# Patient Record
Sex: FEMALE | Race: WHITE | Employment: FULL TIME | URBAN - METROPOLITAN AREA
[De-identification: names, ages, dates, MRNs, and addresses within clinical notes are randomized per-mention and may not be internally consistent; named-entity substitution may affect disease eponyms.]

---

## 2017-06-01 ENCOUNTER — OFFICE VISIT - HEALTHEAST (OUTPATIENT)
Dept: INTERNAL MEDICINE | Facility: CLINIC | Age: 52
End: 2017-06-01

## 2017-06-01 DIAGNOSIS — M54.30 SCIATICA: ICD-10-CM

## 2017-08-16 ENCOUNTER — COMMUNICATION - HEALTHEAST (OUTPATIENT)
Dept: INTERNAL MEDICINE | Facility: CLINIC | Age: 52
End: 2017-08-16

## 2017-08-28 ENCOUNTER — COMMUNICATION - HEALTHEAST (OUTPATIENT)
Dept: ADMINISTRATIVE | Facility: CLINIC | Age: 52
End: 2017-08-28

## 2017-08-28 ENCOUNTER — OFFICE VISIT - HEALTHEAST (OUTPATIENT)
Dept: PODIATRY | Facility: CLINIC | Age: 52
End: 2017-08-28

## 2017-08-28 DIAGNOSIS — L97.509 ULCER OF FOOT, UNSPECIFIED LATERALITY, UNSPECIFIED ULCER STAGE (H): ICD-10-CM

## 2017-08-28 DIAGNOSIS — S92.302A: ICD-10-CM

## 2017-08-28 DIAGNOSIS — S99.922S FOOT INJURY, LEFT, SEQUELA: ICD-10-CM

## 2017-08-28 DIAGNOSIS — L74.512 HYPERHIDROSIS OF PALMS AND SOLES: ICD-10-CM

## 2017-08-28 DIAGNOSIS — L74.513 HYPERHIDROSIS OF PALMS AND SOLES: ICD-10-CM

## 2017-08-28 DIAGNOSIS — S91.312A LACERATION OF LEFT FOOT: ICD-10-CM

## 2017-08-28 DIAGNOSIS — L97.502: ICD-10-CM

## 2017-08-30 ENCOUNTER — RECORDS - HEALTHEAST (OUTPATIENT)
Dept: ADMINISTRATIVE | Facility: OTHER | Age: 52
End: 2017-08-30

## 2017-08-30 ENCOUNTER — AMBULATORY - HEALTHEAST (OUTPATIENT)
Dept: PODIATRY | Facility: CLINIC | Age: 52
End: 2017-08-30

## 2017-08-30 DIAGNOSIS — G89.18 POSTOPERATIVE PAIN: ICD-10-CM

## 2017-09-07 ENCOUNTER — OFFICE VISIT - HEALTHEAST (OUTPATIENT)
Dept: PODIATRY | Facility: CLINIC | Age: 52
End: 2017-09-07

## 2017-09-07 DIAGNOSIS — S92.301D: ICD-10-CM

## 2017-09-07 DIAGNOSIS — S91.312D: ICD-10-CM

## 2017-10-09 ENCOUNTER — RECORDS - HEALTHEAST (OUTPATIENT)
Dept: GENERAL RADIOLOGY | Facility: CLINIC | Age: 52
End: 2017-10-09

## 2017-10-09 ENCOUNTER — OFFICE VISIT - HEALTHEAST (OUTPATIENT)
Dept: PODIATRY | Facility: CLINIC | Age: 52
End: 2017-10-09

## 2017-10-09 ENCOUNTER — AMBULATORY - HEALTHEAST (OUTPATIENT)
Dept: PODIATRY | Facility: CLINIC | Age: 52
End: 2017-10-09

## 2017-10-09 DIAGNOSIS — M72.2 PLANTAR FASCIITIS: ICD-10-CM

## 2017-10-09 DIAGNOSIS — S92.302D FRACTURE OF UNSPECIFIED METATARSAL BONE(S), LEFT FOOT, SUBSEQUENT ENCOUNTER FOR FRACTURE WITH ROUTINE HEALING: ICD-10-CM

## 2017-10-09 DIAGNOSIS — S92.302D: ICD-10-CM

## 2017-10-10 ENCOUNTER — OFFICE VISIT - HEALTHEAST (OUTPATIENT)
Dept: VASCULAR SURGERY | Facility: CLINIC | Age: 52
End: 2017-10-10

## 2017-10-10 DIAGNOSIS — L74.512 HYPERHIDROSIS OF PALMS AND SOLES: ICD-10-CM

## 2017-10-10 DIAGNOSIS — L74.513 HYPERHIDROSIS OF PALMS AND SOLES: ICD-10-CM

## 2017-10-10 DIAGNOSIS — L97.509 ULCER OF FOOT, UNSPECIFIED LATERALITY, UNSPECIFIED ULCER STAGE (H): ICD-10-CM

## 2017-10-10 DIAGNOSIS — S99.922S FOOT INJURY, LEFT, SEQUELA: ICD-10-CM

## 2017-10-31 ENCOUNTER — RECORDS - HEALTHEAST (OUTPATIENT)
Dept: ADMINISTRATIVE | Facility: OTHER | Age: 52
End: 2017-10-31

## 2017-10-31 ENCOUNTER — OFFICE VISIT - HEALTHEAST (OUTPATIENT)
Dept: VASCULAR SURGERY | Facility: CLINIC | Age: 52
End: 2017-10-31

## 2017-10-31 DIAGNOSIS — L74.512 HYPERHIDROSIS OF PALMS AND SOLES: ICD-10-CM

## 2017-10-31 DIAGNOSIS — L74.513 HYPERHIDROSIS OF PALMS AND SOLES: ICD-10-CM

## 2017-10-31 DIAGNOSIS — L97.509 ULCER OF FOOT, UNSPECIFIED LATERALITY, UNSPECIFIED ULCER STAGE (H): ICD-10-CM

## 2017-10-31 DIAGNOSIS — S99.922S FOOT INJURY, LEFT, SEQUELA: ICD-10-CM

## 2017-11-21 ENCOUNTER — OFFICE VISIT - HEALTHEAST (OUTPATIENT)
Dept: VASCULAR SURGERY | Facility: CLINIC | Age: 52
End: 2017-11-21

## 2017-11-21 DIAGNOSIS — L74.512 HYPERHIDROSIS OF PALMS AND SOLES: ICD-10-CM

## 2017-11-21 DIAGNOSIS — S99.922S FOOT INJURY, LEFT, SEQUELA: ICD-10-CM

## 2017-11-21 DIAGNOSIS — L97.509 ULCER OF FOOT, UNSPECIFIED LATERALITY, UNSPECIFIED ULCER STAGE (H): ICD-10-CM

## 2017-11-21 DIAGNOSIS — L74.513 HYPERHIDROSIS OF PALMS AND SOLES: ICD-10-CM

## 2017-11-29 ENCOUNTER — RECORDS - HEALTHEAST (OUTPATIENT)
Dept: ADMINISTRATIVE | Facility: OTHER | Age: 52
End: 2017-11-29

## 2017-12-04 ENCOUNTER — COMMUNICATION - HEALTHEAST (OUTPATIENT)
Dept: VASCULAR SURGERY | Facility: CLINIC | Age: 52
End: 2017-12-04

## 2017-12-04 DIAGNOSIS — L97.502: ICD-10-CM

## 2017-12-05 ENCOUNTER — AMBULATORY - HEALTHEAST (OUTPATIENT)
Dept: VASCULAR SURGERY | Facility: CLINIC | Age: 52
End: 2017-12-05

## 2017-12-07 ENCOUNTER — COMMUNICATION - HEALTHEAST (OUTPATIENT)
Dept: ADMINISTRATIVE | Facility: CLINIC | Age: 52
End: 2017-12-07

## 2017-12-08 ENCOUNTER — COMMUNICATION - HEALTHEAST (OUTPATIENT)
Dept: VASCULAR SURGERY | Facility: CLINIC | Age: 52
End: 2017-12-08

## 2017-12-11 ENCOUNTER — AMBULATORY - HEALTHEAST (OUTPATIENT)
Dept: VASCULAR SURGERY | Facility: CLINIC | Age: 52
End: 2017-12-11

## 2017-12-11 DIAGNOSIS — S92.902G CLOSED FRACTURE OF LEFT FOOT WITH DELAYED HEALING, SUBSEQUENT ENCOUNTER: ICD-10-CM

## 2017-12-19 ENCOUNTER — OFFICE VISIT - HEALTHEAST (OUTPATIENT)
Dept: VASCULAR SURGERY | Facility: CLINIC | Age: 52
End: 2017-12-19

## 2017-12-19 DIAGNOSIS — S92.309A METATARSAL FRACTURE: ICD-10-CM

## 2018-01-02 ENCOUNTER — OFFICE VISIT - HEALTHEAST (OUTPATIENT)
Dept: VASCULAR SURGERY | Facility: CLINIC | Age: 53
End: 2018-01-02

## 2018-01-02 DIAGNOSIS — S92.309A METATARSAL FRACTURE: ICD-10-CM

## 2018-02-27 ENCOUNTER — AMBULATORY - HEALTHEAST (OUTPATIENT)
Dept: VASCULAR SURGERY | Facility: CLINIC | Age: 53
End: 2018-02-27

## 2018-02-27 ENCOUNTER — OFFICE VISIT - HEALTHEAST (OUTPATIENT)
Dept: VASCULAR SURGERY | Facility: CLINIC | Age: 53
End: 2018-02-27

## 2018-02-27 DIAGNOSIS — S92.309A METATARSAL FRACTURE: ICD-10-CM

## 2018-03-20 ENCOUNTER — OFFICE VISIT - HEALTHEAST (OUTPATIENT)
Dept: VASCULAR SURGERY | Facility: CLINIC | Age: 53
End: 2018-03-20

## 2018-03-20 DIAGNOSIS — S92.902D CLOSED FRACTURE OF LEFT FOOT WITH ROUTINE HEALING, SUBSEQUENT ENCOUNTER: ICD-10-CM

## 2018-03-29 ENCOUNTER — OFFICE VISIT - HEALTHEAST (OUTPATIENT)
Dept: VASCULAR SURGERY | Facility: CLINIC | Age: 53
End: 2018-03-29

## 2018-03-29 DIAGNOSIS — B35.1 ONYCHOMYCOSIS: ICD-10-CM

## 2018-03-29 LAB
ALBUMIN SERPL-MCNC: 3.7 G/DL (ref 3.5–5)
ALP SERPL-CCNC: 123 U/L (ref 45–120)
ALT SERPL W P-5'-P-CCNC: 30 U/L (ref 0–45)
AST SERPL W P-5'-P-CCNC: 20 U/L (ref 0–40)
BILIRUB DIRECT SERPL-MCNC: 0.1 MG/DL
BILIRUB SERPL-MCNC: 0.3 MG/DL (ref 0–1)
PROT SERPL-MCNC: 7.2 G/DL (ref 6–8)

## 2018-03-30 ENCOUNTER — AMBULATORY - HEALTHEAST (OUTPATIENT)
Dept: VASCULAR SURGERY | Facility: CLINIC | Age: 53
End: 2018-03-30

## 2018-03-30 DIAGNOSIS — B35.1 ONYCHOMYCOSIS: ICD-10-CM

## 2018-04-12 ENCOUNTER — OFFICE VISIT - HEALTHEAST (OUTPATIENT)
Dept: VASCULAR SURGERY | Facility: CLINIC | Age: 53
End: 2018-04-12

## 2018-04-12 DIAGNOSIS — M84.375D STRESS FRACTURE OF LEFT FOOT WITH ROUTINE HEALING, SUBSEQUENT ENCOUNTER: ICD-10-CM

## 2018-04-26 ENCOUNTER — OFFICE VISIT - HEALTHEAST (OUTPATIENT)
Dept: VASCULAR SURGERY | Facility: CLINIC | Age: 53
End: 2018-04-26

## 2018-04-26 DIAGNOSIS — M84.375D STRESS FRACTURE OF LEFT FOOT WITH ROUTINE HEALING, SUBSEQUENT ENCOUNTER: ICD-10-CM

## 2018-05-10 ENCOUNTER — OFFICE VISIT - HEALTHEAST (OUTPATIENT)
Dept: VASCULAR SURGERY | Facility: CLINIC | Age: 53
End: 2018-05-10

## 2018-05-10 DIAGNOSIS — B35.1 ONYCHOMYCOSIS: ICD-10-CM

## 2018-05-10 DIAGNOSIS — M84.375D STRESS FRACTURE OF LEFT FOOT WITH ROUTINE HEALING, SUBSEQUENT ENCOUNTER: ICD-10-CM

## 2018-05-22 ENCOUNTER — RECORDS - HEALTHEAST (OUTPATIENT)
Dept: ADMINISTRATIVE | Facility: OTHER | Age: 53
End: 2018-05-22

## 2018-07-31 ENCOUNTER — AMBULATORY - HEALTHEAST (OUTPATIENT)
Dept: VASCULAR SURGERY | Facility: CLINIC | Age: 53
End: 2018-07-31

## 2018-07-31 DIAGNOSIS — T14.90XA TRAUMA: ICD-10-CM

## 2018-10-27 ENCOUNTER — HOSPITAL ENCOUNTER (EMERGENCY)
Facility: CLINIC | Age: 53
Discharge: HOME | End: 2018-10-27
Attending: EMERGENCY MEDICINE | Admitting: EMERGENCY MEDICINE
Payer: OTHER MISCELLANEOUS

## 2018-10-27 VITALS
RESPIRATION RATE: 16 BRPM | OXYGEN SATURATION: 96 % | TEMPERATURE: 98.2 F | DIASTOLIC BLOOD PRESSURE: 86 MMHG | SYSTOLIC BLOOD PRESSURE: 151 MMHG | WEIGHT: 214.2 LBS

## 2018-10-27 DIAGNOSIS — L03.113 CELLULITIS OF RIGHT UPPER EXTREMITY: ICD-10-CM

## 2018-10-27 PROCEDURE — 99283 EMERGENCY DEPT VISIT LOW MDM: CPT | Performed by: EMERGENCY MEDICINE

## 2018-10-27 PROCEDURE — 99283 EMERGENCY DEPT VISIT LOW MDM: CPT | Mod: Z6 | Performed by: EMERGENCY MEDICINE

## 2018-10-27 RX ORDER — CEPHALEXIN 500 MG/1
500 CAPSULE ORAL 4 TIMES DAILY
Qty: 40 CAPSULE | Refills: 0 | Status: SHIPPED | OUTPATIENT
Start: 2018-10-27 | End: 2018-11-06

## 2018-10-27 NOTE — DISCHARGE INSTRUCTIONS
Discharge Instructions for Cellulitis  You have been diagnosed with cellulitis. This is an infection in the deepest layer of the skin. In some cases, the infection also affects the muscle. Cellulitis is caused by bacteria. The bacteria can enter the body through broken skin. This can happen with a cut, scratch, animal bite, or an insect bite that has been scratched. You may have been treated in the hospital with antibiotics and fluids. You will likely be given a prescription for antibiotics to take at home. This sheet will help you take care of yourself at home.  Home care  When you are home:    Take the prescribed antibiotic medicine you are given as directed until it is gone. Take it even if you feel better. It treats the infection and stops it from returning. Not taking all the medicine can make future infections hard to treat.    Keep the infected area clean.    When possible, raise the infected area above the level of your heart. This helps keep swelling down.    Talk with your healthcare provider if you are in pain. Ask what kind of over-the-counter medicine you can take for pain.    Apply clean bandages as advised.    Take your temperature once a day for a week.    Wash your hands often to prevent spreading the infection.  In the future, wash your hands before and after you touch cuts, scratches, or bandages. This will help prevent infection.   When to call your healthcare provider  Call your healthcare provider immediately if you have any of the following:    Difficulty or pain when moving the joints above or below the infected area    Discharge or pus draining from the area    Fever of 100.4 F (38 C) or higher, or as directed by your healthcare provider    Pain that gets worse in or around the infected     Redness that gets worse in or around the infected area, particularly if the area of redness expands to a wider area    Shaking chills    Swelling of the infected area    Vomiting   Date Last Reviewed:  8/1/2016 2000-2017 [employer]. [address] [place] [address], [place] [address]. All rights reserved. This information is not intended as a substitute for professional medical care. Always follow your healthcare professional's instructions.

## 2018-10-27 NOTE — ED AVS SNAPSHOT
[facility] Emergency Department    [address] [place] [address]    Phone:  [phone]    Fax:  [phone]                                       Padmini Carpenter   MRN: [id number]    Department:  [facility] Emergency Department   Date of Visit:  10/27/2018           Patient Information     Date Of Birth          1965        Your diagnoses for this visit were:     Cellulitis of right upper extremity        You were seen by Leif Rodrigues MD.      Follow-up Information     Follow up with primary care.    Why:  ER follow up, If symptoms worsen        Discharge Instructions         Discharge Instructions for Cellulitis  You have been diagnosed with cellulitis. This is an infection in the deepest layer of the skin. In some cases, the infection also affects the muscle. Cellulitis is caused by bacteria. The bacteria can enter the body through broken skin. This can happen with a cut, scratch, animal bite, or an insect bite that has been scratched. You may have been treated in the hospital with antibiotics and fluids. You will likely be given a prescription for antibiotics to take at home. This sheet will help you take care of yourself at home.  Home care  When you are home:    Take the prescribed antibiotic medicine you are given as directed until it is gone. Take it even if you feel better. It treats the infection and stops it from returning. Not taking all the medicine can make future infections hard to treat.    Keep the infected area clean.    When possible, raise the infected area above the level of your heart. This helps keep swelling down.    Talk with your healthcare provider if you are in pain. Ask what kind of over-the-counter medicine you can take for pain.    Apply clean bandages as advised.    Take your temperature once a day for a week.    Wash your hands often to prevent spreading the infection.  In the future, wash your hands before and after you touch cuts,  scratches, or bandages. This will help prevent infection.   When to call your healthcare provider  Call your healthcare provider immediately if you have any of the following:    Difficulty or pain when moving the joints above or below the infected area    Discharge or pus draining from the area    Fever of 100.4 F (38 C) or higher, or as directed by your healthcare provider    Pain that gets worse in or around the infected     Redness that gets worse in or around the infected area, particularly if the area of redness expands to a wider area    Shaking chills    Swelling of the infected area    Vomiting   Date Last Reviewed: 8/1/2016 2000-2017 [employer]. [address] [place] [address]. All rights reserved. This information is not intended as a substitute for professional medical care. Always follow your healthcare professional's instructions.          24 Hour Appointment Hotline       To make an appointment at any [facility], call [phone] ([phone]). If you don't have a family doctor or clinic, we will help you find one. [facility] clinics are conveniently located to serve the needs of you and your family.             Review of your medicines      START taking        Dose / Directions Last dose taken    cephALEXin 500 MG capsule   Commonly known as:  KEFLEX   Dose:  500 mg   Quantity:  40 capsule        Take 1 capsule (500 mg) by mouth 4 times daily for 10 days   Refills:  0          Our records show that you are taking the medicines listed below. If these are incorrect, please call your family doctor or clinic.        Dose / Directions Last dose taken    acetaminophen-caffeine 500-65 MG Tabs   Commonly known as:  EXCEDRIN TENSION HEADACHE   Dose:  2 tablet        Take 2 tablets by mouth every 6 hours as needed for mild pain   Refills:  0                Prescriptions were sent or printed at these locations (1 Prescription)                   [facility]  "- [place] - [address]   [address] [place] [address] [facility] [address]    Telephone:  [phone]   Fax:  [phone]   Hours:                  E-Prescribed (1 of 1)         cephALEXin (KEFLEX) 500 MG capsule                Orders Needing Specimen Collection     None      Pending Results     No orders found from 10/25/2018 to 10/28/2018.            Pending Culture Results     No orders found from 10/25/2018 to 10/28/2018.            Pending Results Instructions     If you had any lab results that were not finalized at the time of your Discharge, you can call the ED Lab Result RN at [phone]. You will be contacted by this team for any positive Lab results or changes in treatment. The nurses are available 7 days a week from 10A to 6:30P.  You can leave a message 24 hours per day and they will return your call.        Thank you for choosing [facility]       Thank you for choosing [facility] for your care. Our goal is always to provide you with excellent care. Hearing back from our patients is one way we can continue to improve our services. Please take a few minutes to complete the written survey that you may receive in the mail after you visit with us. Thank you!        [employer] Information     [facility] lets you send messages to your doctor, view your test results, renew your prescriptions, schedule appointments and more. To sign up, go to [email or web address] . Click on \"Log in\" on the left side of the screen, which will take you to the Welcome page. Then click on \"Sign up Now\" on the right side of the page.     You will be asked to enter the access code listed below, as well as some personal information. Please follow the directions to create your username and password.     Your access code is: [email or web address]  Expires: 2019  6:05 PM     Your access code will  in 90 days. If you need help or a new code, please call your [facility] clinic or [phone].        Care EveryWhere ID     This is your " Care EveryWhere ID. This could be used by other organizations to access your [facility] medical records  [id number]        Equal Access to Services     SAJAN GUY : Verona Charlton, uegene roberts, montez vieyra, bret galaviz. So [facility] [phone].    ATENCIÓN: Si habla español, tiene a roman disposición servicios gratuitos de asistencia lingüística. Llame al [phone].    We comply with applicable federal civil rights laws and [place] laws. We do not discriminate on the basis of race, color, national origin, age, disability, sex, sexual orientation, or gender identity.            After Visit Summary       This is your record. Keep this with you and show to your community pharmacist(s) and doctor(s) at your next visit.

## 2018-10-27 NOTE — ED PROVIDER NOTES
History     Chief Complaint   Patient presents with     Medication Reaction     HPI  Padmini Carpenter is a 53 year old female who since the emergency department complaining of a rash to her right upper extremity.  She received the herpes zoster vaccine on Wednesday, 3 days ago.  She noticed redness and swelling and firmness to her right upper extremity near where the injection site was.  She has not had fever, chills, nausea, vomiting, abdominal pain, dysuria, hematuria or other new symptoms,    Problem List:    There are no active problems to display for this patient.       Past Medical History:    History reviewed. No pertinent past medical history.    Past Surgical History:    History reviewed. No pertinent surgical history.    Family History:    No family history on file.    Social History:  Marital Status:   [2]  Social History   Substance Use Topics     Smoking status: Current Every Day Smoker     Packs/day: 1.25     Smokeless tobacco: Never Used     Alcohol use Not on file        Medications:      acetaminophen-caffeine (EXCEDRIN TENSION HEADACHE) 500-65 MG TABS   cephALEXin (KEFLEX) 500 MG capsule         Review of Systems   All other systems reviewed and are negative.      Physical Exam   BP: 151/86  Heart Rate: 82  Temp: 98.2  F (36.8  C)  Resp: 16  Weight: 97.2 kg (214 lb 3.2 oz)  SpO2: 96 %      Physical Exam   Constitutional: She is oriented to person, place, and time. She appears well-developed and well-nourished. No distress.   HENT:   Head: Normocephalic and atraumatic.   Eyes: No scleral icterus.   Neck: Normal range of motion. Neck supple.   Cardiovascular: Normal rate.    Pulmonary/Chest: Effort normal. No respiratory distress.   Abdominal: Soft.   Neurological: She is alert and oriented to person, place, and time.   Skin: Skin is warm and dry. Rash noted. She is not diaphoretic. There is erythema. No pallor.   Area of erythema as shown in the picture below.  There is a firm area where  likely the injection was that is not fluctuant.   Psychiatric: She has a normal mood and affect.           ED Course     ED Course     Procedures            No results found for this or any previous visit (from the past 24 hour(s)).    Medications - No data to display    Assessments & Plan (with Medical Decision Making)  Cellulitis, secondary to recent vaccine.  I think this is less likely to be allergic reaction but more likely to be a complication of infection related to the injection.  I do not think there is any abscess there at this point.  There is no fluctuance.  No systemic symptoms.  She is appropriate for outpatient treatment.  In regards to her follow-up zoster vaccination he may consider doing titers first to see if she needs a second vaccination but I do not think the injection would be contraindicated I would just recommend cleaning the area vigorously prior to giving her follow-up vaccination.  Will treat with Keflex.     I have reviewed the nursing notes.    I have reviewed the findings, diagnosis, plan and need for follow up with the patient.      New Prescriptions    CEPHALEXIN (KEFLEX) 500 MG CAPSULE    Take 1 capsule (500 mg) by mouth 4 times daily for 10 days       Final diagnoses:   Cellulitis of right upper extremity       10/27/2018   [facility] EMERGENCY DEPARTMENT     Leif Rodrigues MD  10/27/18 5419

## 2018-10-27 NOTE — ED AVS SNAPSHOT
[facility] Emergency Department    [address] [place] [address]    Phone:  [phone]    Fax:  [phone]                                       Padmini Carpenter   MRN: [id number]    Department:  [facility] Emergency Department   Date of Visit:  10/27/2018           After Visit Summary Signature Page     I have received my discharge instructions, and my questions have been answered. I have discussed any challenges I see with this plan with the nurse or doctor.    ..........................................................................................................................................  Patient/Patient Representative Signature      ..........................................................................................................................................  Patient Representative Print Name and Relationship to Patient    ..................................................               ................................................  Date                                   Time    ..........................................................................................................................................  Reviewed by Signature/Title    ...................................................              ..............................................  Date                                               Time          22EPIC Rev 08/18

## 2020-06-08 ENCOUNTER — MEDICAL CORRESPONDENCE (OUTPATIENT)
Dept: HEALTH INFORMATION MANAGEMENT | Facility: CLINIC | Age: 55
End: 2020-06-08

## 2020-06-09 ENCOUNTER — HOSPITAL ENCOUNTER (EMERGENCY)
Facility: CLINIC | Age: 55
Discharge: HOME | End: 2020-06-09
Attending: FAMILY MEDICINE | Admitting: FAMILY MEDICINE
Payer: COMMERCIAL

## 2020-06-09 ENCOUNTER — APPOINTMENT (OUTPATIENT)
Dept: GENERAL RADIOLOGY | Facility: CLINIC | Age: 55
End: 2020-06-09
Attending: FAMILY MEDICINE
Payer: COMMERCIAL

## 2020-06-09 ENCOUNTER — APPOINTMENT (OUTPATIENT)
Dept: CT IMAGING | Facility: CLINIC | Age: 55
End: 2020-06-09
Attending: FAMILY MEDICINE
Payer: COMMERCIAL

## 2020-06-09 VITALS
HEART RATE: 75 BPM | TEMPERATURE: 98.2 F | RESPIRATION RATE: 20 BRPM | DIASTOLIC BLOOD PRESSURE: 62 MMHG | SYSTOLIC BLOOD PRESSURE: 107 MMHG | OXYGEN SATURATION: 93 %

## 2020-06-09 DIAGNOSIS — R51.9 ACUTE NONINTRACTABLE HEADACHE, UNSPECIFIED HEADACHE TYPE: ICD-10-CM

## 2020-06-09 DIAGNOSIS — D72.819 LEUKOPENIA, UNSPECIFIED TYPE: ICD-10-CM

## 2020-06-09 DIAGNOSIS — R11.10 VOMITING AND DIARRHEA: ICD-10-CM

## 2020-06-09 DIAGNOSIS — R06.00 DYSPNEA, UNSPECIFIED TYPE: ICD-10-CM

## 2020-06-09 DIAGNOSIS — R19.7 VOMITING AND DIARRHEA: ICD-10-CM

## 2020-06-09 DIAGNOSIS — R91.8 PULMONARY NODULES: ICD-10-CM

## 2020-06-09 DIAGNOSIS — E87.6 HYPOKALEMIA: ICD-10-CM

## 2020-06-09 LAB
ANION GAP SERPL CALCULATED.3IONS-SCNC: 10 MMOL/L (ref 3–14)
BASOPHILS # BLD AUTO: 0 10E9/L (ref 0–0.2)
BASOPHILS NFR BLD AUTO: 0.8 %
BUN SERPL-MCNC: 23 MG/DL (ref 7–30)
CALCIUM SERPL-MCNC: 8.7 MG/DL (ref 8.5–10.1)
CHLORIDE SERPL-SCNC: 112 MMOL/L (ref 94–109)
CO2 SERPL-SCNC: 21 MMOL/L (ref 20–32)
CREAT SERPL-MCNC: 1.24 MG/DL (ref 0.52–1.04)
D DIMER PPP FEU-MCNC: >20 UG/ML FEU (ref 0–0.5)
DIFFERENTIAL METHOD BLD: ABNORMAL
EOSINOPHIL NFR BLD AUTO: 0.8 %
ERYTHROCYTE [DISTWIDTH] IN BLOOD BY AUTOMATED COUNT: 14.5 % (ref 10–15)
GFR SERPL CREATININE-BSD FRML MDRD: 49 ML/MIN/{1.73_M2}
GLUCOSE SERPL-MCNC: 106 MG/DL (ref 70–99)
HCT VFR BLD AUTO: 43.2 % (ref 35–47)
HGB BLD-MCNC: 14.1 G/DL (ref 11.7–15.7)
IMM GRANULOCYTES # BLD: 0.1 10E9/L (ref 0–0.4)
IMM GRANULOCYTES NFR BLD: 4.1 %
LYMPHOCYTES # BLD AUTO: 0.2 10E9/L (ref 0.8–5.3)
LYMPHOCYTES NFR BLD AUTO: 13 %
MCH RBC QN AUTO: 28 PG (ref 26.5–33)
MCHC RBC AUTO-ENTMCNC: 32.6 G/DL (ref 31.5–36.5)
MCV RBC AUTO: 86 FL (ref 78–100)
MONOCYTES # BLD AUTO: 0 10E9/L (ref 0–1.3)
MONOCYTES NFR BLD AUTO: 0.8 %
NEUTROPHILS # BLD AUTO: 1 10E9/L (ref 1.6–8.3)
NEUTROPHILS NFR BLD AUTO: 80.5 %
NRBC # BLD AUTO: 0 10*3/UL
NRBC BLD AUTO-RTO: 2 /100
PLATELET # BLD AUTO: 142 10E9/L (ref 150–450)
POTASSIUM SERPL-SCNC: 3.1 MMOL/L (ref 3.4–5.3)
RBC # BLD AUTO: 5.04 10E12/L (ref 3.8–5.2)
SODIUM SERPL-SCNC: 143 MMOL/L (ref 133–144)
TROPONIN I SERPL-MCNC: <0.015 UG/L (ref 0–0.04)
TROPONIN I SERPL-MCNC: <0.015 UG/L (ref 0–0.04)
WBC # BLD AUTO: 1.2 10E9/L (ref 4–11)

## 2020-06-09 PROCEDURE — 85025 COMPLETE CBC W/AUTO DIFF WBC: CPT | Performed by: FAMILY MEDICINE

## 2020-06-09 PROCEDURE — 96361 HYDRATE IV INFUSION ADD-ON: CPT | Performed by: FAMILY MEDICINE

## 2020-06-09 PROCEDURE — 25000128 H RX IP 250 OP 636: Performed by: FAMILY MEDICINE

## 2020-06-09 PROCEDURE — 71045 X-RAY EXAM CHEST 1 VIEW: CPT | Mod: TC

## 2020-06-09 PROCEDURE — 25800030 ZZH RX IP 258 OP 636: Performed by: EMERGENCY MEDICINE

## 2020-06-09 PROCEDURE — 99285 EMERGENCY DEPT VISIT HI MDM: CPT | Mod: 25 | Performed by: FAMILY MEDICINE

## 2020-06-09 PROCEDURE — 84484 ASSAY OF TROPONIN QUANT: CPT | Mod: 91 | Performed by: FAMILY MEDICINE

## 2020-06-09 PROCEDURE — 93010 ELECTROCARDIOGRAM REPORT: CPT | Mod: Z6 | Performed by: FAMILY MEDICINE

## 2020-06-09 PROCEDURE — 25000125 ZZHC RX 250: Performed by: FAMILY MEDICINE

## 2020-06-09 PROCEDURE — 93005 ELECTROCARDIOGRAM TRACING: CPT | Mod: 76 | Performed by: FAMILY MEDICINE

## 2020-06-09 PROCEDURE — 96374 THER/PROPH/DIAG INJ IV PUSH: CPT | Mod: 59 | Performed by: FAMILY MEDICINE

## 2020-06-09 PROCEDURE — 85379 FIBRIN DEGRADATION QUANT: CPT | Performed by: FAMILY MEDICINE

## 2020-06-09 PROCEDURE — 93005 ELECTROCARDIOGRAM TRACING: CPT | Performed by: FAMILY MEDICINE

## 2020-06-09 PROCEDURE — 25800030 ZZH RX IP 258 OP 636: Performed by: FAMILY MEDICINE

## 2020-06-09 PROCEDURE — 80048 BASIC METABOLIC PNL TOTAL CA: CPT | Performed by: FAMILY MEDICINE

## 2020-06-09 PROCEDURE — 84484 ASSAY OF TROPONIN QUANT: CPT | Performed by: FAMILY MEDICINE

## 2020-06-09 PROCEDURE — 94640 AIRWAY INHALATION TREATMENT: CPT | Performed by: FAMILY MEDICINE

## 2020-06-09 PROCEDURE — 96375 TX/PRO/DX INJ NEW DRUG ADDON: CPT | Performed by: FAMILY MEDICINE

## 2020-06-09 PROCEDURE — 36415 COLL VENOUS BLD VENIPUNCTURE: CPT | Performed by: FAMILY MEDICINE

## 2020-06-09 PROCEDURE — 93010 ELECTROCARDIOGRAM REPORT: CPT | Mod: 76 | Performed by: FAMILY MEDICINE

## 2020-06-09 PROCEDURE — 25000132 ZZH RX MED GY IP 250 OP 250 PS 637: Performed by: FAMILY MEDICINE

## 2020-06-09 PROCEDURE — 71275 CT ANGIOGRAPHY CHEST: CPT

## 2020-06-09 RX ORDER — MORPHINE SULFATE 4 MG/ML
4 INJECTION, SOLUTION INTRAMUSCULAR; INTRAVENOUS ONCE
Status: COMPLETED | OUTPATIENT
Start: 2020-06-09 | End: 2020-06-09

## 2020-06-09 RX ORDER — ONDANSETRON 2 MG/ML
4 INJECTION INTRAMUSCULAR; INTRAVENOUS EVERY 30 MIN PRN
Status: DISCONTINUED | OUTPATIENT
Start: 2020-06-09 | End: 2020-06-09 | Stop reason: HOSPADM

## 2020-06-09 RX ORDER — IPRATROPIUM BROMIDE AND ALBUTEROL SULFATE 2.5; .5 MG/3ML; MG/3ML
3 SOLUTION RESPIRATORY (INHALATION) ONCE
Status: COMPLETED | OUTPATIENT
Start: 2020-06-09 | End: 2020-06-09

## 2020-06-09 RX ORDER — IOPAMIDOL 755 MG/ML
100 INJECTION, SOLUTION INTRAVASCULAR ONCE
Status: COMPLETED | OUTPATIENT
Start: 2020-06-09 | End: 2020-06-09

## 2020-06-09 RX ORDER — POTASSIUM CHLORIDE 1.5 G/1.58G
40 POWDER, FOR SOLUTION ORAL ONCE
Status: COMPLETED | OUTPATIENT
Start: 2020-06-09 | End: 2020-06-09

## 2020-06-09 RX ORDER — IBUPROFEN 800 MG/1
800 TABLET, FILM COATED ORAL ONCE
Status: COMPLETED | OUTPATIENT
Start: 2020-06-09 | End: 2020-06-09

## 2020-06-09 RX ORDER — ONDANSETRON 4 MG/1
4 TABLET, ORALLY DISINTEGRATING ORAL EVERY 6 HOURS PRN
Qty: 12 TABLET | Refills: 0 | Status: SHIPPED | OUTPATIENT
Start: 2020-06-09 | End: 2020-06-14

## 2020-06-09 RX ADMIN — IPRATROPIUM BROMIDE AND ALBUTEROL SULFATE 3 ML: .5; 3 SOLUTION RESPIRATORY (INHALATION) at 02:37

## 2020-06-09 RX ADMIN — MORPHINE SULFATE 4 MG: 4 INJECTION, SOLUTION INTRAMUSCULAR; INTRAVENOUS at 04:40

## 2020-06-09 RX ADMIN — IBUPROFEN 800 MG: 800 TABLET, FILM COATED ORAL at 06:14

## 2020-06-09 RX ADMIN — SODIUM CHLORIDE 1000 ML: 9 INJECTION, SOLUTION INTRAVENOUS at 08:43

## 2020-06-09 RX ADMIN — IOPAMIDOL 70 ML: 755 INJECTION, SOLUTION INTRAVENOUS at 05:11

## 2020-06-09 RX ADMIN — SODIUM CHLORIDE 70 ML: 9 INJECTION, SOLUTION INTRAVENOUS at 05:11

## 2020-06-09 RX ADMIN — POTASSIUM CHLORIDE 40 MEQ: 1.5 POWDER, FOR SOLUTION ORAL at 08:41

## 2020-06-09 RX ADMIN — SODIUM CHLORIDE 1000 ML: 9 INJECTION, SOLUTION INTRAVENOUS at 07:34

## 2020-06-09 RX ADMIN — ONDANSETRON 4 MG: 2 INJECTION INTRAMUSCULAR; INTRAVENOUS at 04:40

## 2020-06-09 NOTE — ED NOTES
Felt light headed after getting up to void. B/P checked, Dr. Lopez notified and fluids ordered and hung. Starting to feeling better.

## 2020-06-09 NOTE — DISCHARGE INSTRUCTIONS
Your work-up is suggestive of a viral infection, quite possibly COVID-19.  Your oxygen levels were excellent but we do need to keep an eye on that and recheck if you worsen.  Expect a call with your COVID results in the next couple of days.  Tylenol/ibuprofen as needed for headache, body aches or fever.  Encourage fluids.  Zofran ODT as needed for nausea.  Return to the ED if increasing shortness of breath, persistent vomiting, bloody diarrhea or any concerns.  You did have a small nodule on your right lung which will require repeat CT scan in 1 year.  Your primary physician can arrange that for you.  In the meantime, try to cut down and eventually quit smoking.  It was a pleasure visiting with you this morning.  I am glad you are feeling at least a little bit better and hope you continue to improve.    Thank you for choosing [facility]. We appreciate the opportunity to meet your urgent medical needs. Please let us know if we could have done anything to make your stay more satisfying.    After discharge, please closely monitor for any new or worsening symptoms. Return to the Emergency Department if you develop any acute worsening signs or symptoms.    If you had lab work, cultures or imaging studies done during your stay, the final results may still be pending. We will call you if your plan of care needs to change. However, if you are not improving as expected, please follow up with your primary care provider or clinic.     Start any prescription medications that were prescribed to you and take them as directed.     Please see additional handouts that may be pertinent to your condition.

## 2020-06-09 NOTE — ED AVS SNAPSHOT
[facility] Emergency Department  [address] [place] [address]  Phone:  [phone]  Fax:  [phone]                                    Padimni Carpenter   MRN: [id number]    Department:  [facility] Emergency Department   Date of Visit:  6/9/2020           After Visit Summary Signature Page    I have received my discharge instructions, and my questions have been answered. I have discussed any challenges I see with this plan with the nurse or doctor.    ..........................................................................................................................................  Patient/Patient Representative Signature      ..........................................................................................................................................  Patient Representative Print Name and Relationship to Patient    ..................................................               ................................................  Date                                   Time    ..........................................................................................................................................  Reviewed by Signature/Title    ...................................................              ..............................................  Date                                               Time          22EPIC Rev 08/18

## 2020-06-09 NOTE — ED TRIAGE NOTES
Pt had a covid test this AM. Smokes one carton of cigarettes a week. Having temps at home. Doesn't have inhalers or nebs. Has'nt really been out any.

## 2020-06-09 NOTE — ED PROVIDER NOTES
History     Chief Complaint   Patient presents with     Shortness of Breath     HPI  Padmini Carpenter is a 55 year old female who presents to the ED with shortness of breath.  She has had trouble since Saturday, 3 days ago.  Had a fever of over 102 all weekend and has a nonproductive cough.  She smokes a carton of cigarettes a week but has not had any problems and wheezing and does not use nebs or inhalers.  She does feel tight and wheezy now.  Had a COVID test done yesterday at our facility but the results are not back yet.  Has not really been out and about to be exposed.  Also N/V/D.    Mild diffuse myalgias and arthralgias along with diffuse headache.    Allergies:  No Known Allergies    Problem List:    There are no active problems to display for this patient.       Past Medical History:    No past medical history on file.    Past Surgical History:    No past surgical history on file.    Family History:    No family history on file.    Social History:  Marital Status:   [2]  Social History     Tobacco Use     Smoking status: Current Every Day Smoker     Packs/day: 1.25     Smokeless tobacco: Never Used   Substance Use Topics     Alcohol use: Not on file     Drug use: Not on file        Medications:    ondansetron (ZOFRAN ODT) 4 MG ODT tab  acetaminophen-caffeine (EXCEDRIN TENSION HEADACHE) 500-65 MG TABS          Review of Systems   All other systems reviewed and are negative.      Physical Exam   BP: 111/66  Pulse: 80  Temp: 98.2  F (36.8  C)  Resp: 20  SpO2: 99 %      Physical Exam  Constitutional:       General: She is in acute distress (mild).      Appearance: She is well-developed.   Eyes:      Extraocular Movements: Extraocular movements intact.   Cardiovascular:      Rate and Rhythm: Normal rate and regular rhythm.   Pulmonary:      Breath sounds: Rhonchi present. No decreased breath sounds or wheezing (just coarse sounds).   Abdominal:      Palpations: Abdomen is soft.      Tenderness: There is  no abdominal tenderness.   Musculoskeletal: Normal range of motion.         General: No swelling or tenderness.   Skin:     General: Skin is warm and dry.   Neurological:      General: No focal deficit present.      Mental Status: She is alert and oriented to person, place, and time.   Psychiatric:         Mood and Affect: Mood normal.         ED Course  (with Medical Decision Making)    55-year-old heavy smoker with 3-day history of fevers along with a nonproductive cough and increasing shortness of breath.  Does not use inhalers or nebs but feels tight and wheezy.  Had a COVID test yesterday but the results are not back yet.  Has not been exposed that she is aware of and really has not gone anywhere.    She does not have much in the way of wheezing on exam but her breath sounds are just a bit coarse and a few rhonchi.  Will try a DuoNeb while waiting for labs to come back.      DuoNeb gave her really no significant relief.       ED Course as of Jun 09 0759   Tue Jun 09, 2020   0325 Troponin I   0327 Basic metabolic panel(!)   0327 CBC with platelets differential(!)   0405 D-dimer markedly elevated at >20.  Chest CT ordered.      0617 Chest CT was negative for pulmonary embolism or infiltrate.  No pleural effusion or evidence of right heart strain.  She does have a 4 mm right middle lobe nodule.  Radiology recommends optional follow-up at 12 months for high risk patients.  She is a smoker.  Initial troponin was undetectable.  Checking a second troponin which is pending.  She is feeling less short of breath.  Oxygen levels have been excellent.  Nursing staff did put her on O2 by nasal cannula just for symptom relief.  Her nausea has also improved.  She has had some mild diffuse arthralgias along with her headache does suggest viral syndrome.  COVID-19 was sent yesterday.      0722 Second troponin was also undetectable.  She feels better after the Toradol and Zofran.  Suspect viral etiology with her fever, body  aches, headache, elevated D-dimer, low WBC and dry cough.  Quite possibly COVID-19.  She was tested at work yesterday and that is pending.  Works at [employer].  She will be off work until those results are back.  Overall, she is feeling better and can be managed as an outpatient.  If she worsens, she needs to return for reevaluation.  She is comfortable with this plan.      0729 She felt a little lightheaded when she got up.  Has been able to drink fluids here systolic blood pressure of 97.  We will give her a liter of normal saline IV and see if that helps her feel a bit better.  I thought I had ordered it earlier but must have forgotten as we had a flurry of ambulances arrive about that time.      0757 Will replace her K+ with oral K-christophe 40 mg        Procedures               EKG Interpretation:      Interpreted by Odin Lopez MD  Time reviewed: 0305  Symptoms at time of EKG: dyspnea   Rhythm: sinus tachycardia  Rate: 106  Axis: Normal  Ectopy: none  Conduction: normal  ST Segments/ T Waves: Mild ST depression V3-6, I, II, F  Q Waves: none  Comparison to prior: No old EKG available    Clinical Impression: Sinus tachycardia at 106 bpm.  Subtle ST depression V3-6, I, II, F.               EKG Interpretation:      Interpreted by Odin Lopez MD  Time reviewed: 5:19 AM    Symptoms at time of EKG: dyspnea   Rhythm: Normal sinus   Rate: 92  Axis: Normal  Ectopy: None  Conduction: Normal  ST Segments/ T Waves: No ST-T wave changes, No acute ischemic changes and mild ST depression appears less on the printed EKG,but when compared on the computer, there are no changes and they lay perfectly  Q Waves: None  Comparison to prior: Unchanged from earlier tonight    Clinical Impression: Sinus rhythm at 92 bpm.  Specific ST/T wave changes with flattening and some mild ST depression which is unchanged from previous EKG.               Critical Care time:  none               Results for orders  placed or performed during the hospital encounter of 06/09/20 (from the past 24 hour(s))   CBC with platelets differential   Result Value Ref Range    WBC 1.2 (L) 4.0 - 11.0 10e9/L    RBC Count 5.04 3.8 - 5.2 10e12/L    Hemoglobin 14.1 11.7 - 15.7 g/dL    Hematocrit 43.2 35.0 - 47.0 %    MCV 86 78 - 100 fl    MCH 28.0 26.5 - 33.0 pg    MCHC 32.6 31.5 - 36.5 g/dL    RDW 14.5 10.0 - 15.0 %    Platelet Count 142 (L) 150 - 450 10e9/L    Diff Method Automated Method     % Neutrophils 80.5 %    % Lymphocytes 13.0 %    % Monocytes 0.8 %    % Eosinophils 0.8 %    % Basophils 0.8 %    % Immature Granulocytes 4.1 %    Nucleated RBCs 2 (H) 0 /100    Absolute Neutrophil 1.0 (L) 1.6 - 8.3 10e9/L    Absolute Lymphocytes 0.2 (L) 0.8 - 5.3 10e9/L    Absolute Monocytes 0.0 0.0 - 1.3 10e9/L    Absolute Basophils 0.0 0.0 - 0.2 10e9/L    Abs Immature Granulocytes 0.1 0 - 0.4 10e9/L    Absolute Nucleated RBC 0.0    Basic metabolic panel   Result Value Ref Range    Sodium 143 133 - 144 mmol/L    Potassium 3.1 (L) 3.4 - 5.3 mmol/L    Chloride 112 (H) 94 - 109 mmol/L    Carbon Dioxide 21 20 - 32 mmol/L    Anion Gap 10 3 - 14 mmol/L    Glucose 106 (H) 70 - 99 mg/dL    Urea Nitrogen 23 7 - 30 mg/dL    Creatinine 1.24 (H) 0.52 - 1.04 mg/dL    GFR Estimate 49 (L) >60 mL/min/[1.73_m2]    GFR Estimate If Black 57 (L) >60 mL/min/[1.73_m2]    Calcium 8.7 8.5 - 10.1 mg/dL   Troponin I   Result Value Ref Range    Troponin I ES <0.015 0.000 - 0.045 ug/L   D dimer quantitative   Result Value Ref Range    D Dimer >20.0 (H) 0.0 - 0.50 ug/ml FEU   XR Chest Port 1 View    Narrative    EXAM: XR CHEST PORT 1 VW  LOCATION: [facility]  DATE/TIME: 6/9/2020 3:03 AM    INDICATION: Shortness of breath  COMPARISON: None.      Impression    IMPRESSION: Shallow inspiration. Cardiomediastinal silhouette is within normal limits. No focal consolidation or pleural effusion. Atherosclerotic aorta. Degenerative change osseous structures.   CT Chest Pulmonary  Embolism w Contrast    Narrative    EXAM: CT CHEST PULMONARY EMBOLISM W CONTRAST  LOCATION: [facility]  DATE/TIME: 6/9/2020 5:01 AM    INDICATION: Dyspnea and elevated d-dimer.  COMPARISON: None.  TECHNIQUE: CT chest pulmonary angiogram during arterial phase injection of IV contrast. Multiplanar reformats and MIP reconstructions were performed. Dose reduction techniques were used.   CONTRAST: 70mLs Isovue 370    FINDINGS:  ANGIOGRAM CHEST: Pulmonary arteries are normal caliber and negative for pulmonary emboli. Thoracic aorta is not well opacified and is  indeterminate for dissection. No CT evidence of right heart strain.    LUNGS AND PLEURA: Mild basilar atelectasis. 4 mm subpleural nodule right middle lobe series 5 image 77. No pleural effusion.    MEDIASTINUM/AXILLAE: Coronary artery calcification. Atherosclerotic aorta. No significant pericardial effusion.    UPPER ABDOMEN: Normal.    MUSCULOSKELETAL: Degenerative change osseous structures.      Impression    IMPRESSION:  1.  No infiltrate, pleural effusion or pulmonary embolism.  2.  4 mm right middle lobe nodule.    REFERENCE:  Guidelines for Management of Incidental Pulmonary Nodules Detected on CT Images: From the Fleischner Society 2017.   Guidelines apply to incidental nodules in patients who are 35 years or older.  Guidelines do not apply to lung cancer screening, patients with immunosuppression, or patients with known primary cancer.    SINGLE NODULE  Nodule size <6 mm  Low-risk patients: No follow-up needed.  High-risk patients: Optional follow-up at 12 months.    Nodule size 6-8 mm  Low-risk patients: Follow-up CT at 6-12 months, then consider CT at 18-24 months.  High-risk patients: Follow-up CT at 6-12 months, then at 18-24 months if no change.    Nodule size >8 mm  Either low or high-risk patients:  Consider CT, PET/CT, or tissue sampling at 3 months.    Consider referral to lung nodule clinic.     Troponin I   Result Value Ref Range     Troponin I ES <0.015 0.000 - 0.045 ug/L       Medications   ondansetron (ZOFRAN) injection 4 mg (4 mg Intravenous Given 6/9/20 0440)   0.9% sodium chloride BOLUS (1,000 mLs Intravenous New Bag 6/9/20 0734)   potassium chloride (KLOR-CON) Packet 40 mEq (has no administration in time range)   ipratropium - albuterol 0.5 mg/2.5 mg/3 mL (DUONEB) neb solution 3 mL (3 mLs Nebulization Given 6/9/20 0237)   morphine (PF) injection 4 mg (4 mg Intravenous Given 6/9/20 0440)   iopamidol (ISOVUE-370) solution 100 mL (70 mLs Intravenous Given 6/9/20 0511)   Sodium Chloride 0.9 % bag 100mL for CT scan (70 mLs Intravenous Given 6/9/20 0511)   sodium chloride (PF) 0.9% PF flush 3 mL (10 mLs Intracatheter Given 6/9/20 0511)   ibuprofen (ADVIL/MOTRIN) tablet 800 mg (800 mg Oral Given 6/9/20 0614)       Assessments & Plan     I have reviewed the nursing notes.    I have reviewed the findings, diagnosis, plan and need for follow up with the patient.       New Prescriptions    ONDANSETRON (ZOFRAN ODT) 4 MG ODT TAB    Take 1 tablet (4 mg) by mouth every 6 hours as needed for nausea       Final diagnoses:   Dyspnea, unspecified type   Leukopenia, unspecified type - suspect viral etiology - possibly COVID-19   Vomiting and diarrhea   Acute nonintractable headache, unspecified headache type   Pulmonary nodules - solitary 4 mm RML   Hypokalemia - mild 3.1       6/9/2020   [facility] EMERGENCY DEPARTMENT     Odin Lopez MD  06/09/20 0192

## 2020-06-10 ENCOUNTER — VIRTUAL VISIT (OUTPATIENT)
Dept: PEDIATRICS | Facility: CLINIC | Age: 55
End: 2020-06-10
Payer: COMMERCIAL

## 2020-06-10 DIAGNOSIS — R51.9 NONINTRACTABLE HEADACHE, UNSPECIFIED CHRONICITY PATTERN, UNSPECIFIED HEADACHE TYPE: Primary | ICD-10-CM

## 2020-06-10 PROCEDURE — 99203 OFFICE O/P NEW LOW 30 MIN: CPT | Mod: GT | Performed by: FAMILY MEDICINE

## 2020-06-10 RX ORDER — TRAMADOL HYDROCHLORIDE 50 MG/1
50 TABLET ORAL EVERY 6 HOURS PRN
Qty: 8 TABLET | Refills: 0 | Status: SHIPPED | OUTPATIENT
Start: 2020-06-10 | End: 2020-06-12

## 2020-06-10 NOTE — PROGRESS NOTES
"Padmini Carpenter is a 55 year old female who is being evaluated via a billable video visit.      The patient has been notified of following:     \"This video visit will be conducted via a call between you and your physician/provider. We have found that certain health care needs can be provided without the need for an in-person physical exam.  This service lets us provide the care you need with a video conversation.  If a prescription is necessary we can send it directly to your pharmacy.  If lab work is needed we can place an order for that and you can then stop by our lab to have the test done at a later time.    Video visits are billed at different rates depending on your insurance coverage.  Please reach out to your insurance provider with any questions.    If during the course of the call the physician/provider feels a video visit is not appropriate, you will not be charged for this service.\"    Patient has given verbal consent for Video visit? Yes    How would you like to obtain your AVS? Mail a copy    Patient would like the video invitation sent by: Text to cell phone: [phone]    Will anyone else be joining your video visit? No      Subjective     Padmini Carpenter is a 55 year old female who presents today via video visit for the following health issues:    HPI      Video Start Time: 11:11 AM    Headaches      Duration: Monday 2 days ago    Description  Location: bilateral in the frontal area   Character: throbbing pain  Frequency:  constant    Intensity:  moderate    Accompanying signs and symptoms:    Precipitating or Alleviating factors:  Nausea/vomiting: no  Dizziness: no  Weakness or numbness: no  Visual changes: none  Fever: YES  Associated with shortness of breath and cough, COVID screen is pending.    History  Head trauma: YES  Family history of migraines: YES  Previous tests for headaches: no   Neurologist evaluations: no   Able to do daily activities when headache present: no   Wake with " headaches: no   Daily pain medication use: YES  Any changes in: None    Precipitating or Alleviating factors (light/sound/sleep/caffeine): Flu-like symptoms.    Frequent/daily pain medication use: no     Patient states she has a personal history of migraines occurs rarely and improved with Excedrin.    ED records reviewed.    There is no problem list on file for this patient.    History reviewed. No pertinent surgical history.    Social History     Tobacco Use     Smoking status: Current Every Day Smoker     Packs/day: 1.25     Smokeless tobacco: Never Used   Substance Use Topics     Alcohol use: Not on file     History reviewed. No pertinent family history.      Current Outpatient Medications   Medication Sig Dispense Refill     traMADol (ULTRAM) 50 MG tablet Take 1 tablet (50 mg) by mouth every 6 hours as needed for severe pain 8 tablet 0     acetaminophen-caffeine (EXCEDRIN TENSION HEADACHE) 500-65 MG TABS Take 2 tablets by mouth every 6 hours as needed for mild pain       ondansetron (ZOFRAN ODT) 4 MG ODT tab Take 1 tablet (4 mg) by mouth every 6 hours as needed for nausea 12 tablet 0     No Known Allergies    Reviewed and updated as needed this visit by Provider  Tobacco  Allergies  Meds  Problems  Med Hx  Surg Hx  Fam Hx         Review of Systems   Constitutional, HEENT, cardiovascular, pulmonary, gi and gu systems are negative, except as otherwise noted.      Objective    There were no vitals taken for this visit.  There is no height or weight on file to calculate BMI.  Physical Exam     GENERAL: Healthy, alert and no distress  EYES: Eyes grossly normal to inspection.  No discharge or erythema, or obvious scleral/conjunctival abnormalities.  RESP: No audible wheeze, cough, or visible cyanosis.  No visible retractions or increased work of breathing.    SKIN: Visible skin clear. No significant rash, abnormal pigmentation or lesions.  NEURO: Cranial nerves grossly intact.  Mentation and speech appropriate  for age.  PSYCH: Mentation appears normal, affect normal/bright, judgement and insight intact, normal speech and appearance well-groomed.            Assessment & Plan     1. Nonintractable headache, unspecified chronicity pattern, unspecified headache type  Headaches don't seem typical for migraines, Headache not improving with tyelenol, excedrin or ibuprofen. Tramadol for break through pain although advised patient to use with caution. Await COVID result otherwise see at the clinic in a week if COVID screen is negative,  - traMADol (ULTRAM) 50 MG tablet; Take 1 tablet (50 mg) by mouth every 6 hours as needed for severe pain  Dispense: 8 tablet; Refill: 0       Return in about 1 week (around 6/17/2020), or if symptoms worsen or fail to improve, for recheck headache/possible migraine with negative COVID test..    Kadie Sarkar MD  [facility]      Video-Visit Details    Type of service:  Video Visit    Video End Time:11:22 AM    Originating Location (pt. Location): Home    Distant Location (provider location):  [facility]     Platform used for Video Visit: Doximity    Return in about 1 week (around 6/17/2020), or if symptoms worsen or fail to improve, for recheck headache/possible migraine with negative COVID test..       Kadie Sarkar MD

## 2020-06-30 ENCOUNTER — OFFICE VISIT (OUTPATIENT)
Dept: PEDIATRICS | Facility: CLINIC | Age: 55
End: 2020-06-30
Payer: COMMERCIAL

## 2020-06-30 VITALS
WEIGHT: 210.2 LBS | OXYGEN SATURATION: 96 % | SYSTOLIC BLOOD PRESSURE: 131 MMHG | DIASTOLIC BLOOD PRESSURE: 83 MMHG | HEART RATE: 72 BPM | TEMPERATURE: 98.6 F
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
## 2020-06-30 PROCEDURE — 99203 OFFICE O/P NEW LOW 30 MIN: CPT | Performed by: INTERNAL MEDICINE

## 2020-06-30 RX ORDER — CETIRIZINE HYDROCHLORIDE 10 MG/1
TABLET ORAL
Qty: 30 TABLET | Refills: 0 | Status: SHIPPED | OUTPATIENT
Start: 2020-06-30

## 2020-06-30 ASSESSMENT — PAIN SCALES - GENERAL: PAINLEVEL: NO PAIN (0)

## 2020-06-30 NOTE — PROGRESS NOTES
Subjective     Padmini Carpenter is a 55 year old female who presents to clinic today for the following health issues:    HPI   Rash  Onset: last Thursday    Description:   Location: all over, legs, arms, torso and back  Character: round and red with dot in the middle like a bug bite  Itching (Pruritis): YES    Progression of Symptoms:  same    Accompanying Signs & Symptoms:  Fever: no   Body aches or joint pain: no   Sore throat symptoms: no   Recent cold symptoms: no     History:   Previous similar rash: no     Precipitating factors:   Exposure to similar rash: no   New exposures: Patient was stung by a wasp the day before on bottom of her foot  Recent travel: no     Alleviating factors:   None    Therapies Tried and outcome: benadryl    The rash started the day after she came back from visiting her mother. She had helped her mother with yard work.     Pt reported no hotel stay. Her  went on the same trip but no similar rash.     ROS:  Constitutional, HEENT, cardiovascular, pulmonary, gi and gu systems are negative, except as otherwise noted.       acetaminophen-caffeine (EXCEDRIN TENSION HEADACHE) 500-65 MG TABS, Take 2 tablets by mouth every 6 hours as needed for mild pain  [] amoxicillin-clavulanate (AUGMENTIN) 875-125 MG tablet, Take 1 tablet by mouth 2 times daily for 7 days  [] ondansetron (ZOFRAN ODT) 4 MG ODT tab, Take 1 tablet (4 mg) by mouth every 6 hours as needed for nausea  [] traMADol (ULTRAM) 50 MG tablet, Take 1 tablet (50 mg) by mouth every 6 hours as needed for severe pain    No current facility-administered medications on file prior to visit.          There is no problem list on file for this patient.    History reviewed. No pertinent surgical history.    Social History     Tobacco Use     Smoking status: Current Every Day Smoker     Packs/day: 1.25     Smokeless tobacco: Never Used   Substance Use Topics     Alcohol use: Not on file     History reviewed. No pertinent  family history.          Problem list, Medication list, Allergies, and Medical/Social/Surgical histories reviewed in [facility] and updated as appropriate.    OBJECTIVE:                                                    /83 (BP Location: Right arm, Patient Position: Sitting, Cuff Size: Adult Large)   Pulse 72   Temp 98.6  F (37  C) (Temporal)   Wt 95.3 kg (210 lb 3.2 oz)   LMP 03/01/2017 (Approximate)   SpO2 96%     GENERAL: healthy, alert and no distress  Skin: Multiple pink/purplish macules with central punctate on the trunk and lower extremities.      Diagnostic test results:  No results found for any visits on 06/30/20.      ASSESSMENT/PLAN:                                                      55 year old female with the following diagnoses and treatment plan:      ICD-10-CM    1. Bug bite without infection, initial encounter  W57.XXXA cetirizine (ZYRTEC) 10 MG tablet       --Rash consistent with insect/bug bites.  Does not appear to be cellulitic in nature.  I recommended control with high-dose antihistamine as her primary symptoms are itching.  See AVS for details of instruction    Will call or return to clinic if worsening or symptoms not improving as discussed.  See Patient Instructions.      Cyndi Pierce MD-PhD  [facility]    (Note: Chart documentation was done in part with Dragon Voice Recognition software. Although reviewed after completion, some word and grammatical errors may remain.)

## 2020-06-30 NOTE — PATIENT INSTRUCTIONS
Make appointment(s) for:   --     Zyrtec 20 mg in the morning for one week, then 1 tablet daily for 1-2 weeks.    Benadryl at night time.         Medication(s) prescribed today:    Orders Placed This Encounter   Medications     cetirizine (ZYRTEC) 10 MG tablet     Si tablets in the morning daily for one week and 1 tablet daily until gone     Dispense:  30 tablet     Refill:  0

## 2021-01-15 ENCOUNTER — HEALTH MAINTENANCE LETTER (OUTPATIENT)
Age: 56
End: 2021-01-15

## 2021-05-31 VITALS — BODY MASS INDEX: 37.08 KG/M2 | WEIGHT: 216 LBS

## 2021-06-09 ENCOUNTER — TELEPHONE (OUTPATIENT)
Dept: FAMILY MEDICINE | Facility: CLINIC | Age: 56
End: 2021-06-09

## 2021-06-09 NOTE — TELEPHONE ENCOUNTER
Patient Quality Outreach Summary      Summary:    Patient is due/failing the following:   Breast Cancer Screening - Mammogram    Type of outreach:    Sent OneClass message.    Questions for provider review:    None                                                                                                                    Sydney Alexis

## 2021-06-11 NOTE — PROGRESS NOTES
[facility] Follow Up Note  Assessment/Plan  1. Sciatica         Patient Instructions   1. Medications were reviewed and medication refills completed if requested.   A corrected Medication List is listed below on this After Visit    Summary.   New Medications, Refilled medications or Discontinued medications are also listed below.      2. Immunizations were reviewed and updated as necessary.   All up to date  3. If labs were done today, they will either be mailed to you or released to My Chart online if that has been activated.  4. Steroid burst -12 day course of prednisone.  Avoid Advil while on prednisone.  Side effects high-dose prednisone could very likely include insomnia or agitation.  5. Rx pain pills -Rx tramadol to use for breakthrough pain as needed  6.  Because she has no motor weakness and only a two-week history of sciatica obviously we would defer any imaging studies like MRI of the lumbar spine.  7.  We discussed the natural history of sciatica and I showed her a spine model and what would be involved typical sciatica.  We discussed the natural history usually involves spontaneous resolution with time.  The likelihood of getting imaging or ultimate surgery should still be low.     MORE THAN 25 MINUTES WAS SPENT WITH THE PATIENT TODAY OF WHICH GREATER THAN 50% WAS SPENT IN COUNSELING AND COORDINATION OF CARE -  DISCUSSING THE AFOREMENTIONED DIAGNOSES, TREATMENT, AND PLAN.       Body mass index is 37.08 kg/(m^2).    Franky Fine MD  Internal Medicine [facility] [place] [address]  Voice: [phone]  Fax: [phone]    +++++++++++++++++++++++++++++++++++++++    Padmini Carpenter   52 y.o. female    Date of Visit: 6/1/2017    Chief Complaint   Patient presents with     Sciatica     Subjective  1. Health Maintenance  -immunizations are up-to-date.     2.  Acute sciatica -the patient has never had back or sciatica type problems  before.  Over the last 2 weeks she has had pain that emanates from her right buttock and travels down to her knee.  Occasionally the pain travels below her knee to mid calf.  It is located in L5-S1 distribution on the back of her leg.  She has noted no gait abnormalities and no weakness.  Is episodic but when it comes on it is painful.  She has not been taking any over-the-counter medication for this.  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  This weekend they will be traveling about 4 - 5 hours by car to [place] and she will be working over the weekend doing a lot of standing and lifting.  She is worried that her pain will be worse or that she will not have anything to control the pain.        ROS A comprehensive review of systems was performed and was negative other than the problems noted above.    Medications, allergies, and problem list were reviewed and updated    Past Medical History:   Diagnosis Date     Migraine      Ovarian cyst      Past Surgical History:   Procedure Laterality Date     EYE SURGERY      lasik     Social History     Social History     Marital status:      Spouse name: N/A     Number of children: N/A     Years of education: N/A     Occupational History     Not on file.     Social History Main Topics     Smoking status: Current Every Day Smoker     Smokeless tobacco: Not on file     Alcohol use Yes      Comment: rarely     Drug use: No     Sexual activity: Yes     Partners: Male     Birth control/ protection: None     Other Topics Concern     Not on file     Social History Narrative    She is  and has a son and daughter. 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  She does smoke cigarettes.     Family History   Problem Relation Age of Onset      Diabetes Father       after complications from hip surgery at age 80     Cancer Paternal Grandfather      Other Mother      alive       Current Outpatient Prescriptions   Medication Sig Dispense Refill     aspirin-acetaminophen-caffeine (EXCEDRIN MIGRAINE) 250-250-65 mg per tablet Take 1 tablet by mouth every 6 (six) hours as needed for pain.       ibuprofen (ADVIL) 200 MG tablet Take 200 mg by mouth every 6 (six) hours as needed for pain or mild pain (1-3).       predniSONE (DELTASONE) 20 MG tablet Take 3 tabs daily for four days, then take 2 tabs for four days, then 1 tab daily for four days 24 tablet 0     traMADol (ULTRAM) 50 mg tablet Take 1 tablet (50 mg total) by mouth every 8 (eight) hours as needed for pain. 90 tablet 0     No current facility-administered medications for this visit.        No Known Allergies    Exam  Vitals:    17 1424   BP: 122/72   Pulse: 72     The patient is well-groomed and well-dressed alert and oriented ×3.   Head: Negative  HEENT: Normal  Extremities: No peripheral edema.  Pulses are normal and symmetrical.  Neuro: Gait and mentation normal. Cranial Nerves intact.  Right leg raising is negative sitting.  Straight leg raising is positive while supine on the right side.  There is no localizing back or buttock pain or tenderness.  Great toe flexion-extension, foot flexion extension and quadricep strength are all normal and symmetrical.          Franky Fine MD

## 2021-06-12 NOTE — PROGRESS NOTES
Admission History & Physical  Padmini Carpenter, 1965, [id number]    [facility]  April D MD Mirza, [phone]    Extended Emergency Contact Information  Primary Emergency Contact: Tahir Carpenter   [facility]  Home Phone: [phone]  Relation: Spouse     Assessment and Plan:   Assessment: Fractured first metatarsal and laceration left lower extremity  Plan: The patient is to return to the clinic in 10 days for postop visit #2  Active Problems:    * No active hospital problems. *      Chief Complaint:  Laceration left foot     HPI:    Padmini Carpenter is a 52 y.o. old female who presented to the clinic today for postop visit #1, 3 days status post repair of laceration left foot.  The patient stated that approximately 4 days ago she was in [place] and severely injured her foot on a metal pipe.  She was evaluated at the ED at the local hospital.  She had an x-ray taken.  The x-ray showed a spiral oblique nondisplaced fracture of the first metatarsal.  The patient also had severe laceration on the lateral aspect of the left heel.  The patient underwent surgical debridement and primary repair of the laceration by Dr Huseyin Ram at [id number].  She was placed on antibiotics and given a tetanus injection.   History is provided by patient    Medical History  Active Ambulatory (Non-Hospital) Problems    Diagnosis     Migraines     Past Medical History:   Diagnosis Date     Migraine      Ovarian cyst      Patient Active Problem List    Diagnosis Date Noted     Migraines 06/10/2015     Surgical History  She  has a past surgical history that includes Eye surgery.   Past Surgical History:   Procedure Laterality Date     EYE SURGERY      lasik    Social History  Reviewed, and she  reports that she has been smoking.  She has never used smokeless tobacco. She reports that she drinks alcohol. She reports that she does not use illicit drugs.  Social History   Substance Use Topics     Smoking  status: Current Every Day Smoker     Smokeless tobacco: Never Used     Alcohol use Yes      Comment: rarely      Allergies  No Known Allergies Family History  Reviewed, and family history includes Cancer in her paternal grandfather; Diabetes in her father; Other in her mother.   Psychosocial Needs  Social History     Social History Narrative    She is  and has a son and daughter. 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  She does smoke cigarettes.     Additional psychosocial needs reviewed per nursing assessment.       Prior to Admission Medications     (Not in a hospital admission)        Review of Systems - Negative     BP (!) 140/30  Pulse 68  Resp 16  SpO2 97%    Objective findings: General: The patient is alert and in no acute distress    Integument: Skin right foot warm supple and intact.    Vascular: DP PT pulses palpable right foot.  Good capillary refill bilateral feet.    Neurologic: Negative clonus negative Babinski right lower extremity.    Musculoskeletal: Range of motion within normal limits right lower extremity.  Muscle power +5/5 bilaterally in all compartments.  There is a posterior splint on the left lower extremity secondary to surgical repair of laceration of left foot.  No active bleeding noted.  The dressings are clean dry and intact.    Assessment: Fractured first metatarsal and a laceration left foot    Plan: The bandage was left in place.  The patient is to return to the clinic in 10 days at which time the dressings will be removed.  Sutures may also be removed at that time.  The patient is to continue taking antibiotics.

## 2021-06-12 NOTE — PROGRESS NOTES
Subjective findings: The patient return to the clinic today for postop visit #2 status post primary repair of the lacerated left foot.  The patient also has a nondisplaced spiral oblique fracture of the first metatarsal.  The patient stated she has some mild to moderate pain.  She has been nonweightbearing since her last visit.    Objective findings: The wound margins are well coaptated and maintained.  There is moderate edema noted left foot.  Neurovascular status is intact.  Vital signs are stable.  There is no drainage or active bleeding noted.    The dressings are clean dry and intact.     Assessment: Fractured first metatarsal and a laceration left foot     Plan: The sutures were removed today.  A soft dressing was applied to the left foot.  The patient is to remain nonweightbearing for the next 4 weeks.  She is to remove her bandages in 2 weeks.  She is to return to the clinic in 4 weeks for postop visit #3.

## 2021-06-13 NOTE — PROGRESS NOTES
Subjective findings: The patient return to the clinic today for postop visit #3 status post primary repair of the lacerated left foot.  The patient also has a nondisplaced spiral oblique fracture of the first metatarsal.  She has been nonweightbearing since her last visit.  She continues to notice some moderate drainage from the incision.     Objective findings: The wound margins are well healed. There is minimal  edema noted left foot.  Neurovascular status is intact.  Vital signs are stable.  There is no drainage or active bleeding noted.  No sign of infection.       Assessment: Fractured first metatarsal and a laceration left foot      Plan: An x-ray of the left foot was taken today.  I informed the patient that the x-ray shows that the fracture has healed well.  The laceration is continuing to  granulate well.  I recommended that she continue to remain nonweightbearing for the next 6 weeks to allow the incision to heal completely.  I recommended she return to the clinic if she notices a sudden severe increase in pain, edema, erythema, drainage or bleeding.  She is to return to the clinic in 6 weeks for her next scheduled appointment.

## 2021-06-13 NOTE — PROGRESS NOTES
Follow up Vascular Visit       Date of Service:10/10/2017    Date Last Seen: Visit date not found; Visit date not found    Chief Complaint: left heel traumatic ulcers    History:   Past Medical History:   Diagnosis Date     Migraine      Ovarian cyst        Pt returns to the Vascular clinic with regards to their left heel traumatic ulcers. 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 Was taken to the ER; this was in [place]. They did some type of surgery on her. She then followed up with Dr. Isidro. She reports there was a fracture in the mid foot. She is using a sock as her dressing; not soaking; she is getting wet in the shower. She denies fevers, chills. Minimal pain. Using off the shelf CAM boot and knee rolling walker. She does report that she has excessive sweating of her palms and soles of the feet; she has never had this addressed. I saw the patient in conjunction with Dr. Hay, he reviewed the films; and ordered weight bearing films.     Allergies: Review of patient's allergies indicates no known allergies.    Physical Exam:    /80  Pulse (!) 52  Temp 98.2  F (36.8  C) (Oral)   Resp 16    General:  Patient presents to clinic in no apparent distress.  Head: normocephalic atraumatic  Psychiatric:  Alert and oriented x3.   Respiratory: unlabored breathing; no cough  Integumentary:  Skin is uniformly warm, dry and pink.    Extremities: left medial heel and achilles region with desiccated necrotic ulcerations; these were sharply debrided and much  afterwards; also has fissure on the plantar midfoot also necrotic; this was also debridement; sole of foot became diaphoretic during cares; some maceration noted in the skin fold of the 1st toe plantar surface; moderate edema in the foot; no  erythema  Circumferential volume measures:    No flowsheet data found.      Ulceration(s)/Wound(s):     Wound 10/10/17 left medial heel (Active)   Pre Size Length 1.1 10/10/2017 10:00 AM   Pre Size Width 5 10/10/2017 10:00 AM   Pre Size Depth 0.1 10/10/2017 10:00 AM   Pre Total Sq cm 5.5 10/10/2017 10:00 AM       Wound 10/10/17 left achilles (Active)   Pre Size Length 2.5 10/10/2017 10:00 AM   Pre Size Width 1 10/10/2017 10:00 AM   Pre Size Depth 0.1 10/10/2017 10:00 AM   Pre Total Sq cm 2.5 10/10/2017 10:00 AM       Wound 10/10/17 left plantar foot (Active)   Pre Size Length 0.5 10/10/2017 10:00 AM   Pre Size Width 3 10/10/2017 10:00 AM   Pre Size Depth 0.1 10/10/2017 10:00 AM   Pre Total Sq cm 1.5 10/10/2017 10:00 AM       Lab Values  No results found for: SEDRATE  Lab Results   Component Value Date    CREATININE 0.90 06/10/2015     No results found for: HGBA1C  Lab Results   Component Value Date    BUN 13 06/10/2015     No results found for: LABPROT, LABALUM, ALBUMIN  No results found for: [id number]          Impression:   traumatic ulcer to right heel  Hyperhidrosis  Edema  Navicular fracture        Are any of these wounds new today: Yes; Location: right heel    Assessment/Plan:          1. Excisional debridement of the ulcer(s) was recommended today, after consent was obtained and 2% Xylocaine was applied using a sterile curet the epidermal, dermal and down into the subcutaneous tissue was sharply debrided for a total square cm of 9.5. The devitalized and necrotic tissue was removed and the wounds appeared much  afterwards. The patient tolerated this well.           2. Edema: will apply single layer tubigrip           3.  Wound treatment:will order santyl and drysol; wash the wounds with saline daily; pat dry; apply drysol to the intact skin only; apply thick layer of santyl to the open areas; cover with oil emulsion; gauze; rolled gauze; will order supplies. Dr. Hay to manage on the periphery for  the fractures.           4. Nutrition: normal renal function; weight stable; will continue to monitor no nutritional labs today           5. Offloading: I spoke with Dr. Hay about her CAM; he was fine with this with the use of the knee rolling walker     Patient will follow up with me in 3 weeks for reevaluation. They were instructed to call the clinic sooner with any signs or symptoms of infection or any further questions/concerns. Answered all questions.    Mihcelle Tucker DNP, RN, CNP, [facility]  [facility]  [phone]

## 2021-06-13 NOTE — PROGRESS NOTES
Follow up Vascular Visit       Date of Service:10/31/2017    Date Last Seen: Visit date not found; Visit date not found    Chief Complaint: left heel traumatic ulcers    History:   Past Medical History:   Diagnosis Date     Migraine      Ovarian cyst        Pt returns to the Vascular clinic with regards to their left heel traumatic ulcers. 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 Was taken to the ER; this was in [place]. They did some type of surgery on her. She then followed up with Dr. Isidro. She reports there was a fracture in the mid foot. Dr. Hay had her obtain additional xrays; d/w him today and he would like another interval check on fractures; will get this ordered; there was some concern by the radiologist for osteomyelitis; I had Dr. Hay check on this and he did not have any concern about bone infection. She has been applying santyl daily to the wounds; covering with oil emulsion and gauze; securing with rolled gauze. This is going well; she feels the wounds are nearly healed. She is also using drysol for her hyperhidrosis this is helping some but still soaking through socks on occasion. She denies fevers, chills. Minimal pain. Using off the shelf CAM boot and knee rolling walker.     Allergies: Review of patient's allergies indicates no known allergies.    Physical Exam:    /82  Pulse 60  Resp 16    General:  Patient presents to clinic in no apparent distress.  Head: normocephalic atraumatic  Psychiatric:  Alert and oriented x3.   Respiratory: unlabored breathing; no cough  Integumentary:  Skin is uniformly warm, dry and pink.    Extremities: left medial heel and achilles region with x3 full thickness ulcers; see measures below; these are significantly improved and nearly healed; were covered with  yellow slough; this was all debrided much ; there was surrounding thickened hyperkeratotic skin; this was also removed; trace edema in the foot; no erythema    Circumferential volume measures:    No flowsheet data found.      Ulceration(s)/Wound(s):     Wound 10/10/17 left medial heel (Active)   Pre Size Length 0.5 10/31/2017  9:00 AM   Pre Size Width 1.5 10/31/2017  9:00 AM   Pre Size Depth 0.1 10/10/2017 10:00 AM   Pre Total Sq cm 0.75 10/31/2017  9:00 AM       Wound 10/10/17 left achilles (Active)   Pre Size Length 0.3 10/31/2017  9:00 AM   Pre Size Width 0.2 10/31/2017  9:00 AM   Pre Size Depth 0.1 10/10/2017 10:00 AM   Pre Total Sq cm 0.06 10/31/2017  9:00 AM             Lab Values  No results found for: SEDRATE  Lab Results   Component Value Date    CREATININE 0.90 06/10/2015     No results found for: HGBA1C  Lab Results   Component Value Date    BUN 13 06/10/2015     No results found for: LABPROT, LABALUM, ALBUMIN  No results found for: [id number]          Impression:   traumatic ulcer to left heel  Hyperhidrosis  Edema  Navicular fracture        Are any of these wounds no    Assessment/Plan:          1. Excisional debridement of the ulcer(s) was recommended today, after consent was obtained and 2% Xylocaine was applied using a sterile curet the epidermal, dermal and down into the subcutaneous tissue was sharply debrided for a total square cm of 0.81. The devitalized and necrotic tissue was removed and the wounds appeared much  afterwards. The patient tolerated this well.           2. Edema: will apply single layer tubigrip           3.  Wound treatment:continue santyl and drysol; wash the wounds with saline daily; pat dry; apply drysol to the intact skin only; apply thick layer of santyl to the open areas; cover with oil emulsion; apply aquaphor to the healed areas; apply AmLactin 12% lotion to the thickened skin areas; gauze; rolled gauze; will order supplies. Dr. Hay to manage on the  periphery for the fractures; sent orders for interval check on the foot fractures; 3 views standing           4. Nutrition: normal renal function; weight stable; will continue to monitor no nutritional labs today           5. Offloading: I spoke with Dr. Hay about her CAM; he was fine with this with the use of the knee rolling walker     Patient will follow up with me in 3 weeks for reevaluation. They were instructed to call the clinic sooner with any signs or symptoms of infection or any further questions/concerns. Answered all questions.    Michelle Tucker DNP, RN, CNP, [facility]  [phone]

## 2021-06-14 NOTE — PROGRESS NOTES
Follow up Vascular Visit       Date of Service:11/21/2017    Date Last Seen: Visit date not found; Visit date not found    Chief Complaint: left heel traumatic ulcers    History:   Past Medical History:   Diagnosis Date     Migraine      Ovarian cyst        Pt returns to the Vascular clinic with regards to their left heel traumatic ulcers. 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 Was taken to the ER; this was in [place]. They did some type of surgery on her. She then followed up with Dr. Isidro. She reports there was a fracture in the mid foot. Dr. Hay had her obtain additional xrays; d/w him today and he would like another interval check on fractures; will get this ordered; there was some concern by the radiologist for osteomyelitis; I had Dr. Hay check on this and he did not have any concern about bone infection. She has been applying santyl daily to the wounds; covering with oil emulsion and gauze; securing with rolled gauze. This is going well; she feels the wounds are nearly healed. She is also using drysol for her hyperhidrosis this is helping some but still soaking through socks on occasion. She denies fevers, chills. Minimal pain. Using off the shelf CAM boot and knee rolling walker. She did not get her interval xrays yet.    Allergies: Review of patient's allergies indicates no known allergies.    Physical Exam:    /90  Pulse 68  Resp 16    General:  Patient presents to clinic in no apparent distress.  Head: normocephalic atraumatic  Psychiatric:  Alert and oriented x3.   Respiratory: unlabored breathing; no cough  Integumentary:  Skin is uniformly warm, dry and pink.    Extremities: left medial heel and achilles region with x3 full thickness ulcers; see measures below;  were covered with eschar; this  was all debrided and was healed underneath; there was surrounding thickened hyperkeratotic skin; this was also removed; trace edema in the foot; no erythema    Circumferential volume measures:    No flowsheet data found.      Ulceration(s)/Wound(s):     Wound 10/10/17 left medial heel (Active)   Pre Size Length 0.5 10/31/2017  9:00 AM   Pre Size Width 1.5 10/31/2017  9:00 AM   Pre Size Depth 0.1 10/10/2017 10:00 AM   Pre Total Sq cm 0.75 10/31/2017  9:00 AM       Wound 10/10/17 left achilles (Active)   Pre Size Length 0.3 10/31/2017  9:00 AM   Pre Size Width 0.2 10/31/2017  9:00 AM   Pre Size Depth 0.1 10/10/2017 10:00 AM   Pre Total Sq cm 0.06 10/31/2017  9:00 AM             Lab Values  No results found for: SEDRATE  Lab Results   Component Value Date    CREATININE 0.90 06/10/2015     No results found for: HGBA1C  Lab Results   Component Value Date    BUN 13 06/10/2015     No results found for: LABPROT, LABALUM, ALBUMIN  No results found for: [id number]          Impression:   traumatic ulcer to left heel  Hyperhidrosis  Edema  Navicular fracture        Are any of these wounds no    Assessment/Plan:          1. Excisional debridement of the ulcer(s) was recommended today, after consent was obtained and 2% Xylocaine was applied using a sterile curet the epidermal, dermal  was sharply debrided for a total square cm of 2. The devitalized and necrotic tissue was removed and the wounds appeared much  afterwards. The patient tolerated this well.           2. Edema: will apply single layer tubigrip           3.  Wound treatment:continue drysol as needed; wounds are healed; no dressings, can get wet in the shower, apply AmLactin 12% lotion to the thickened skin areas; if this is resolved then go to Dr. Satnam levy to manage on the periphery for the fractures; sent orders for interval check on the foot fractures; 3 views standing           4. Nutrition: normal renal function; weight stable; will continue to  monitor no nutritional labs today           5. Offloading: I spoke with Dr. Hay about her CAM; he was fine with this with the use of the knee rolling walker; pending xray results may be able to get out of this     Patient will follow up with me PRN for reevaluation. They were instructed to call the clinic sooner with any signs or symptoms of infection or any further questions/concerns. Answered all questions.    Michelle Tucker DNP, RN, CNP, [facility]  [phone]

## 2021-06-14 NOTE — PROGRESS NOTES
I spoke with patient directly regarding her most recent xrays done yesterday; I reviewed these with Dr. Hay. There is a new diastasis of the ligament between the 1st and 2nd toes; also some new bone remodeling which was not seen on previous films. He was comfortable with having the patient start to slowly go back into regular shoes; if she develops pain then she should go back to her CAM boot and make follow up appt. She is currently having no pain in the foot. Answered all questions.

## 2021-06-14 NOTE — PROGRESS NOTES
FOOT AND ANKLE SURGERY/PODIATRY Progress Note        ASSESSMENT:   Fracture 1st metatarsal left foot      TREATMENT:  -The patient appears to be doing well today. She has had no pain with ambulation in CAM boot over the past several weeks. Weight bearing x-rays obtained today indicate a healing oblique fracture at the base of the 1st metatarsal. Does not appear to be an intra-articular fracture. There is bony bridging along lucency without evidence of lateral cortical reaction or gapping along the medial cuneiform and 2nd metatarsal base. Prior films indicate a gap between the medial cuneiform and 2nd metatarsal base with lateral cortical reaction. I discussed with the patient that a diastasis between the 2nd metatarsal and medial cuneiform is concerning for disruption of lisfranc's ligament. Will closely monitor. If symptoms develop along this area, will likely refer for MRI.   -Because the fracture line has incomplete healing, I recommend continued limited walking in the CAM boot. The patient admits to smoking. Discussed smoking cessation today and concern for delayed healing.   -All questions invited and answered. I have asked her to follow-up in two weeks for repeat weight bearing x-rays of the left foot.       Brian Hay, [facility]      HPI: I was asked to see this patient by Michelle Tucker for a 1st metatarsal fracture on the left foot. The patient states she initially injured the left foot in August of this year with a metal pipe while in [place]. There was a laceration at the time of injury which was surgically treated and has since resolved. She was placed non-weight bearing by Dr. Isidro and has been progressing well. In recent weeks she has been ambulating in a CAM boot without pain. She admits to smoking.     Past Medical History:   Diagnosis Date     Migraine      Ovarian cyst        No Known Allergies      Current Outpatient Prescriptions:      ammonium lactate (AMLACTIN) 12 %  lotion, Apply to feet daily, Disp: 400 g, Rfl: 0     aspirin-acetaminophen-caffeine (EXCEDRIN MIGRAINE) 250-250-65 mg per tablet, Take 1 tablet by mouth every 6 (six) hours as needed for pain., Disp: , Rfl:      ibuprofen (ADVIL) 200 MG tablet, Take 200 mg by mouth every 6 (six) hours as needed for pain or mild pain (1-3)., Disp: , Rfl:      aluminum chloride (DRYSOL DAB-O-MATIC) 20 % external solution, Apply to intact skin only on the foot, Disp: 35 mL, Rfl: 0    Current Facility-Administered Medications:      lidocaine 2 % jelly (XYLOCAINE), , Topical, PRN, Michelle Tucker NP, 1 application at 11/21/17 0758    Review of Systems - Negative       OBJECTIVE:  Appearance: alert, well appearing, and in no distress.    Vitals:    12/19/17 0800   BP: 100/62   Resp: 16   Temp: 98.6  F (37  C)       Vascular: Dorsalis pedis palpableLeft.  Dermatologic: No open lesions noted left foot.   Neurologic: Intact to light touch Left.  Musculoskeletal: No pain along 1st metatarsal base left or along 1st/2nd tarsometatarsal joints left. No pain to palpation along lesser metatarsals or midfoot left foot.     Imaging: 3 views weight bearing left foot: Interval bony healing since last visit at the 1st metatarsal base. Does not appear to be intra-articular. There is a lucency with bony bridging extending from the medial to lateral cortex. Reduced lateral cortical reaction. No appreciable gap between medial cuneiform and 2nd metatarsal.     Picture: none

## 2021-06-15 NOTE — PROGRESS NOTES
FOOT AND ANKLE SURGERY/PODIATRY Progress Note        ASSESSMENT:   Fracture 1st metatarsal left foot      TREATMENT:  -The patient appears to be doing well, no pain with ambulation in the CAM boot or on exam today. X-rays indicate minimal lucency at the 1st metatarsal base, improved since the last visit.   -Based on this information I recommend she begin to ween out of the CAM boot and into regular shoes over the next 3-5 days. If pain develops, she will return to the CAM boot. She will also gradually increase activities as symptoms allow.  -She is discharged from my care at this time, but encouraged to return as concerns develop.       Brian Hay, [facility]      HPI: Padmini Carpenter was seen again today for a 1st metatarsal fracture left foot. She denies pain with ambulation in the CAM boot. No new concerns.      Past Medical History:   Diagnosis Date     Migraine      Ovarian cyst        No Known Allergies      Current Outpatient Prescriptions:      ammonium lactate (AMLACTIN) 12 % lotion, Apply to feet daily, Disp: 400 g, Rfl: 0     aspirin-acetaminophen-caffeine (EXCEDRIN MIGRAINE) 250-250-65 mg per tablet, Take 1 tablet by mouth every 6 (six) hours as needed for pain., Disp: , Rfl:      ibuprofen (ADVIL) 200 MG tablet, Take 200 mg by mouth every 6 (six) hours as needed for pain or mild pain (1-3)., Disp: , Rfl:     Current Facility-Administered Medications:      lidocaine 2 % jelly (XYLOCAINE), , Topical, PRN, Michelle Tucker NP, 1 application at 11/21/17 0758    Review of Systems - Negative       OBJECTIVE:  Appearance: alert, well appearing, and in no distress.    Vitals:    01/02/18 0828   BP: 120/66   Pulse: 62   Resp: 18   Temp: 98.3  F (36.8  C)   SpO2: 96%       Vascular: Dorsalis pedis palpableLeft.  Dermatologic: Normal skin texture/turgor left foot.   Neurologic: Intact to light touch Left.  Musculoskeletal: No pain along 1st metatarsal or 1st ray left foot. No pain along  lisfranc's joint left foot.     Imaging: Bony bridging along mild lucency at the 1st metatarsal base, improved since last visit. This lucency is not visualized on oblique or lateral. No gapping along lisfranc's ligament. Lateral cortex appears to be intact.     Picture: none

## 2021-06-16 PROBLEM — M84.375D STRESS FRACTURE OF LEFT FOOT WITH ROUTINE HEALING, SUBSEQUENT ENCOUNTER: Status: ACTIVE | Noted: 2018-04-12

## 2021-06-16 NOTE — PROGRESS NOTES
FOOT AND ANKLE SURGERY/PODIATRY Progress Note        ASSESSMENT:   1st metatarsal fracture left foot  Plantar fasciitis       TREATMENT:  -The patient complains of dorsal, lateral left foot pain which has increased since her last visit. She denies trauma. Symptoms worse with walking and towards the end of the day. Mild pain along the 4th TMT on exam today. Discussed differential diagnosis including stress fracture, DJD, or increased symptoms after being immobilized for 1st metatarsal fracture. No pain along 1st TMT today. X-rays are negative.   -I recommend she resume limited walking in the CAM boot. Will consider MRI if no improvement.   -She is also having mild plantar fasciitis. Recommend stretching exercises. Will consider oral anti-inflammatory or steroid injection.   -She will follow-up with me in 2 weeks.      Brian Hay, HARLAN  [facility] [place]      HPI: Padmini Carpenter was seen again today for left foot pain which has developed along the lateral left foot. She describes having mild pain, daily, towards the end of the day. Worse with increased walking. Denies trauma.       Past Medical History:   Diagnosis Date     Migraine      Ovarian cyst        No Known Allergies      Current Outpatient Prescriptions:      ammonium lactate (AMLACTIN) 12 % lotion, Apply to feet daily, Disp: 400 g, Rfl: 0     aspirin-acetaminophen-caffeine (EXCEDRIN MIGRAINE) 250-250-65 mg per tablet, Take 1 tablet by mouth every 6 (six) hours as needed for pain., Disp: , Rfl:      ibuprofen (ADVIL) 200 MG tablet, Take 200 mg by mouth every 6 (six) hours as needed for pain or mild pain (1-3)., Disp: , Rfl:     Current Facility-Administered Medications:      lidocaine 2 % jelly (XYLOCAINE), , Topical, PRN, Michelle Tucker NP, 1 application at 11/21/17 0758    Review of Systems - Negative       OBJECTIVE:  Appearance: alert, well appearing, and in no distress.    Vitals:    02/27/18 0920   BP: 116/62   Pulse: 60   Resp: 16    Temp: 98.1  F (36.7  C)       Vascular: Dorsalis pedis palpableLeft.  Dermatologic: Normal skin texture/turgor left foot.   Neurologic: Intact to light touch Left.  Musculoskeletal: Mild pain along base of 4th metatarsal at lisfranc's joint. Mild pain along plantar left heel. No pain along 1st metatarsal or 1st TMT left.     Imaging: No acute changes along 4th metatarsal or 4th TMT left foot. Previous fracture site at base of 1st metatarsal appears well consolidated.     Picture: none

## 2021-06-17 NOTE — PROGRESS NOTES
FOOT AND ANKLE SURGERY/PODIATRY Progress Note        ASSESSMENT:   Stress Reaction 4th metatarsal left foot  Onychomycosis      TREATMENT:  -MRI report indicates a stress reaction along the base of the 4th metatarsal, and non-union 1st metatarsal. Reviewed these findings with the patient. I recommend she resume use of the CAM boot at this time. Likely will treat x6 weeks in CAM boot, based on progression of symptoms.   -Nail fungus likely bilateral feet. Discussed topical and oral anti-fungal medication including risks such as elevated LFT's. Patient would like to begin oral medication. Referred for hepatic panel. If ok, will begin terbinafine 250mg qday x90 days.   -She will follow-up with me in two weeks.      Brian Hay, [facility]      HPI: Padmini Carpenter was seen again today for left foot pain and to review the MRI report. She continues to experience mild discomfort with walking. She also would like to discuss treatment options for fungal nails.     Past Medical History:   Diagnosis Date     Migraine      Ovarian cyst        No Known Allergies      Current Outpatient Prescriptions:      aspirin-acetaminophen-caffeine (EXCEDRIN MIGRAINE) 250-250-65 mg per tablet, Take 1 tablet by mouth every 6 (six) hours as needed for pain., Disp: , Rfl:      ibuprofen (ADVIL) 200 MG tablet, Take 200 mg by mouth every 6 (six) hours as needed for pain or mild pain (1-3)., Disp: , Rfl:     Current Facility-Administered Medications:      lidocaine 2 % jelly (XYLOCAINE), , Topical, PRN, Michelle Tucker NP, 1 application at 11/21/17 0758    Review of Systems - Negative       OBJECTIVE:  Appearance: alert, well appearing, and in no distress.    Vitals:    03/29/18 0925   BP: 110/72   Pulse: 60   Resp: 14   Temp: 97.9  F (36.6  C)       Vascular: Dorsalis pedis palpableBilateral.  Dermatologic: Dystrophic changes hallux, 5th digit bilateral.   Neurologic: Intact to light touch Bilateral.  Musculoskeletal:  Mild pain along 4th TMT left. No pain along 1st metatarsal left.     Imaging: EXAM DATE:         03/27/2018     [facility]  MRI FOOT LEFT W/O CONTRAST  3/27/2018 11:45 AM     INDICATION: Fracture of left foot.  TECHNIQUE: Unenhanced.  COMPARISON: X-ray 3/20/2018.     INTERPRETATION:  TENDONS: The Achilles tendon is not included on the field-of-view and cannot be  evaluated. Within the foot, the flexor tendons are negative for tendinopathy,  tenosynovitis, or tearing. The extensor tendons are negative for tendinopathy,  tenosynovitis, or tearing. The hallucis tendons are negative. The distal  peroneal tendons are negative.     LIGAMENTS: The ligament of Lisfranc is negative. The collateral ligaments at the  MTP joints are all intact.     BONES AND SOFT TISSUES: Mild stress reaction within the base of the fourth  metatarsal. No evidence for emilie fracture. Mild bone edema within the cuboid  could also be secondary to stress reaction. Degenerative change at the  naviculocuneiform articulation. Nondisplaced and likely subacute fracture of the  base of the first metatarsal corresponds to the x-ray abnormality. There is  trace bone edema along the margins of this fracture. There is no significant  intramuscular signal abnormality, mass, or fluid collection. There is mild  nonspecific edema in the subcutaneous soft tissues along the dorsal aspect of  the foot. No evidence for organized mass or fluid collection. No tarsal/plantar  disruption. No evidence for Roe's neuroma.     CONCLUSION:  1.  Subacute to chronic-appearing fracture of the base of the first metatarsal.  There is still some bone edema along the margins but no displacement and very  little surrounding edema.  2.  Stress reaction within the base of the fourth metatarsal. No evidence for  emilie fracture.  3.  Degenerative change at the naviculocuneiform articulation.  4.  Reactive edema within the subcutaneous soft tissues along the  dorsolateral  aspect of the foot.  5.  Mild degenerative change first MTP joint.  6.  No evidence for tendinous or ligamentous pathology.    Picture: None

## 2021-06-17 NOTE — PROGRESS NOTES
FOOT AND ANKLE SURGERY/PODIATRY Progress Note        ASSESSMENT:   Stress Reaction 4th metatarsal left foot  Onychomycosis      TREATMENT:  -The patient has had no pain with walking in the CAM boot. There is no pain on exam today along the 4th metatarsal. Based on this information, she will begin to ween out of the CAM boot over the next week and into regular shoes. If pain occurs, she will resume use of the CAM boot.   -Continue with oral terbinafine. Repeat LFT's in 2 weeks.  -The patient will monitor for concerns and return as needed for follow-up.       Brian Hay, [facility]      HPI: Padmini Carpenter was seen again today stress reaction 4th metatarsal left foot and onychomycosis. She continues to remain limited walking in the CAM boot without pain. She is taking terbinafine without concerns. Continues to smoke.     Past Medical History:   Diagnosis Date     Migraine      Ovarian cyst        No Known Allergies      Current Outpatient Prescriptions:      aspirin-acetaminophen-caffeine (EXCEDRIN MIGRAINE) 250-250-65 mg per tablet, Take 1 tablet by mouth every 6 (six) hours as needed for pain., Disp: , Rfl:      ibuprofen (ADVIL) 200 MG tablet, Take 200 mg by mouth every 6 (six) hours as needed for pain or mild pain (1-3)., Disp: , Rfl:      terbinafine HCl (LAMISIL) 250 mg tablet, Take 1 tablet (250 mg total) by mouth daily., Disp: 90 tablet, Rfl: 0    Current Facility-Administered Medications:      lidocaine 2 % jelly (XYLOCAINE), , Topical, PRN, Michelle Tucker NP, 1 application at 11/21/17 0758    Review of Systems - Negative       OBJECTIVE:  Appearance: alert, well appearing, and in no distress.    Vitals:    05/10/18 0907   BP: 120/66   Pulse: 78       Vascular: Dorsalis pedis palpableLeft.  Dermatologic:  Normal skin texture/turgor left.   Neurologic: Intact to light touch Left.  Musculoskeletal: No pain along base of 4th metatarsal left foot. No pain along 1st metatarsal base.      Imaging: None    Picture: None

## 2021-06-17 NOTE — PROGRESS NOTES
FOOT AND ANKLE SURGERY/PODIATRY Progress Note        ASSESSMENT:   Stress Reaction 4th metatarsal left foot  Onychomycosis      TREATMENT:  -There is minimal pain/tenderness on exam today along the 4th metatarsal, improved since her last visit. She is ambulating in the CAM boot without pain.   -I recommend she continue to stay limited walking in the CAM boot at all times. Continue with oral terbinafine. Repeat LFT's in 4 weeks.  -Follow-up with me in two weeks.      Brian Hay DPM  [facility] [place]      HPI: Padmini Carpenter was seen again today stress reaction 4th metatarsal left foot and onychomycosis. She continues to remain limited walking in the CAM boot without pain. She started taking terbinafine two weeks ago. She continues to smoke.     Past Medical History:   Diagnosis Date     Migraine      Ovarian cyst        No Known Allergies      Current Outpatient Prescriptions:      terbinafine HCl (LAMISIL) 250 mg tablet, Take 1 tablet (250 mg total) by mouth daily., Disp: 90 tablet, Rfl: 0     aspirin-acetaminophen-caffeine (EXCEDRIN MIGRAINE) 250-250-65 mg per tablet, Take 1 tablet by mouth every 6 (six) hours as needed for pain., Disp: , Rfl:      ibuprofen (ADVIL) 200 MG tablet, Take 200 mg by mouth every 6 (six) hours as needed for pain or mild pain (1-3)., Disp: , Rfl:     Current Facility-Administered Medications:      lidocaine 2 % jelly (XYLOCAINE), , Topical, PRN, Michelle Tucker NP, 1 application at 11/21/17 0758    Review of Systems - Negative       OBJECTIVE:  Appearance: alert, well appearing, and in no distress.    Vitals:    04/26/18 0919   BP: 110/62   Pulse: (!) 55   Resp: 16   Temp: 98.5  F (36.9  C)   SpO2: 97%       Vascular: Dorsalis pedis palpableLeft.  Dermatologic:  Normal skin texture/turgor left.   Neurologic: Intact to light touch Left.  Musculoskeletal: Minimal pain along base of 4th metatarsal left foot. No pain along 1st metatarsal base.     Imaging: None    Picture:  None

## 2021-06-17 NOTE — PROGRESS NOTES
FOOT AND ANKLE SURGERY/PODIATRY Progress Note        ASSESSMENT:   Stress Reaction 4th metatarsal left foot  Onychomycosis      TREATMENT:  -The patient is ambulating in the CAM boot without pain. There is mild pain on exam today, improved since her last visit. She continues to smoke.   -I recommend she continue to stay limited walking in the CAM boot at all times. Continue with oral terbinafine. Repeat LFT's in 6 weeks.  -Follow-up with me in two weeks.      Brian Hay DPM  [facility]      HPI: Padmini Carpenter was seen again today stress reaction 4th metatarsal left foot and onychomycosis. She has remained limited walking in the CAM boot. No pain walking in the CAM boot. She also has started terbinafine as directed.       Past Medical History:   Diagnosis Date     Migraine      Ovarian cyst        No Known Allergies      Current Outpatient Prescriptions:      aspirin-acetaminophen-caffeine (EXCEDRIN MIGRAINE) 250-250-65 mg per tablet, Take 1 tablet by mouth every 6 (six) hours as needed for pain., Disp: , Rfl:      ibuprofen (ADVIL) 200 MG tablet, Take 200 mg by mouth every 6 (six) hours as needed for pain or mild pain (1-3)., Disp: , Rfl:      terbinafine HCl (LAMISIL) 250 mg tablet, Take 1 tablet (250 mg total) by mouth daily., Disp: 90 tablet, Rfl: 0    Current Facility-Administered Medications:      lidocaine 2 % jelly (XYLOCAINE), , Topical, PRN, Michelle Tucker, GAGANDEEP, 1 application at 11/21/17 0758    Review of Systems - Negative       OBJECTIVE:  Appearance: alert, well appearing, and in no distress.    Vitals:    04/12/18 0904   BP: 130/74   Pulse: 72   Temp: 98.1  F (36.7  C)       Vascular: Dorsalis pedis palpableLeft.  Dermatologic:  Normal skin texture/turgor left.   Neurologic: Intact to light touch Left.  Musculoskeletal: Mild pain along base of 4th metatarsal left foot. No pain along 1st metatarsal base.     Imaging: None    Picture: NONE

## 2021-07-03 NOTE — ADDENDUM NOTE
Addendum Note by Michelle Braun NP at 11/21/2017  8:26 AM     Author: Michelle Braun NP Service: -- Author Type: Nurse Practitioner    Filed: 11/21/2017  8:26 AM Encounter Date: 11/21/2017 Status: Signed    : Michelle Braun NP (Nurse Practitioner)    Addended by: MICHELLE BRAUN on: 11/21/2017 08:26 AM        Modules accepted: Orders

## 2021-10-21 ENCOUNTER — APPOINTMENT (OUTPATIENT)
Dept: URGENT CARE | Facility: URGENT CARE | Age: 56
End: 2021-10-21
Payer: COMMERCIAL

## 2021-10-24 ENCOUNTER — HEALTH MAINTENANCE LETTER (OUTPATIENT)
Age: 56
End: 2021-10-24

## 2021-12-23 ENCOUNTER — LAB REQUISITION (OUTPATIENT)
Dept: LAB | Facility: CLINIC | Age: 56
End: 2021-12-23

## 2021-12-23 ENCOUNTER — APPOINTMENT (OUTPATIENT)
Dept: FAMILY MEDICINE | Facility: CLINIC | Age: 56
End: 2021-12-23
Payer: COMMERCIAL

## 2021-12-23 DIAGNOSIS — Z20.822 CONTACT WITH AND (SUSPECTED) EXPOSURE TO COVID-19: ICD-10-CM

## 2021-12-23 PROCEDURE — U0003 INFECTIOUS AGENT DETECTION BY NUCLEIC ACID (DNA OR RNA); SEVERE ACUTE RESPIRATORY SYNDROME CORONAVIRUS 2 (SARS-COV-2) (CORONAVIRUS DISEASE [COVID-19]), AMPLIFIED PROBE TECHNIQUE, MAKING USE OF HIGH THROUGHPUT TECHNOLOGIES AS DESCRIBED BY CMS-2020-01-R: HCPCS | Performed by: INTERNAL MEDICINE

## 2021-12-24 LAB — SARS-COV-2 RNA RESP QL NAA+PROBE: POSITIVE

## 2022-02-13 ENCOUNTER — HEALTH MAINTENANCE LETTER (OUTPATIENT)
Age: 57
End: 2022-02-13

## 2022-10-15 ENCOUNTER — HEALTH MAINTENANCE LETTER (OUTPATIENT)
Age: 57
End: 2022-10-15

## 2023-03-26 ENCOUNTER — HEALTH MAINTENANCE LETTER (OUTPATIENT)
Age: 58
End: 2023-03-26

## 2024-05-26 ENCOUNTER — HEALTH MAINTENANCE LETTER (OUTPATIENT)
Age: 59
End: 2024-05-26